# Patient Record
Sex: MALE | URBAN - METROPOLITAN AREA
[De-identification: names, ages, dates, MRNs, and addresses within clinical notes are randomized per-mention and may not be internally consistent; named-entity substitution may affect disease eponyms.]

---

## 2017-06-27 ENCOUNTER — RECORDS - HEALTHEAST (OUTPATIENT)
Dept: LAB | Facility: HOSPITAL | Age: 27
End: 2017-06-27

## 2017-06-28 LAB — HBV SURFACE AB SERPL IA-ACNC: NEGATIVE M[IU]/ML

## 2020-03-25 ENCOUNTER — RECORDS - HEALTHEAST (OUTPATIENT)
Dept: LAB | Facility: CLINIC | Age: 30
End: 2020-03-25

## 2020-03-27 LAB
GAMMA INTERFERON BACKGROUND BLD IA-ACNC: 0.05 IU/ML
M TB IFN-G BLD-IMP: NEGATIVE
MITOGEN IGNF BCKGRD COR BLD-ACNC: 0 IU/ML
MITOGEN IGNF BCKGRD COR BLD-ACNC: 0 IU/ML
QTF INTERPRETATION: NORMAL
QTF MITOGEN - NIL: 5.9 IU/ML

## 2020-07-23 ENCOUNTER — RECORDS - HEALTHEAST (OUTPATIENT)
Dept: LAB | Facility: HOSPITAL | Age: 30
End: 2020-07-23

## 2020-07-23 LAB — HEPATITIS B SURFACE ANTIBODY LHE- HISTORICAL: NEGATIVE

## 2020-07-24 LAB
GAMMA INTERFERON BACKGROUND BLD IA-ACNC: 0.05 IU/ML
M TB IFN-G BLD-IMP: NEGATIVE
MITOGEN IGNF BCKGRD COR BLD-ACNC: 0.05 IU/ML
MITOGEN IGNF BCKGRD COR BLD-ACNC: 0.11 IU/ML
QTF INTERPRETATION: NORMAL
QTF MITOGEN - NIL: 7.32 IU/ML

## 2020-12-21 ENCOUNTER — OFFICE VISIT - HEALTHEAST (OUTPATIENT)
Dept: TELEHEALTH | Facility: CLINIC | Age: 30
End: 2020-12-21

## 2020-12-21 ENCOUNTER — AMBULATORY - HEALTHEAST (OUTPATIENT)
Dept: FAMILY MEDICINE | Facility: CLINIC | Age: 30
End: 2020-12-21

## 2020-12-21 DIAGNOSIS — Z20.828 EXPOSURE TO SARS-ASSOCIATED CORONAVIRUS: ICD-10-CM

## 2020-12-21 DIAGNOSIS — Z20.822 SUSPECTED COVID-19 VIRUS INFECTION: ICD-10-CM

## 2020-12-23 ENCOUNTER — COMMUNICATION - HEALTHEAST (OUTPATIENT)
Dept: SCHEDULING | Facility: CLINIC | Age: 30
End: 2020-12-23

## 2021-04-22 ENCOUNTER — RECORDS - HEALTHEAST (OUTPATIENT)
Dept: LAB | Facility: HOSPITAL | Age: 31
End: 2021-04-22

## 2021-04-22 LAB — HBV SURFACE AB SERPL IA-ACNC: POSITIVE M[IU]/ML

## 2021-06-13 NOTE — PATIENT INSTRUCTIONS - HE
CDC Education: Antibiotics Aren't Always the Answer   https://www.cdc.gov/antibiotic-use/community/pdfs/aaw/AU_esdras_8_5x11_508.pdf  The symptoms you describe suggest a viral cause, which is much more common than a bacterial cause. Antibiotics will treat bacterial infections, but have no effect on viral infections. If possible, especially if improving, start with symptom care for the first 7-10 days, then consider seeking further treatment or taking an antibiotic. Bacterial infections generally are more severe, including symptoms such as pus, fever over 101degrees F, or rapidly worsening.  Your symptoms show that you may have coronavirus (COVID-19). This illness can cause fever, cough and trouble breathing. Many people get a mild case and get better on their own. Some people can get very sick.     Will I be tested for COVID-19?  We would like to test you for Covid-19 virus. I have placed orders for this test.      For all employees or close contacts (except [place] and [employer] - see below), go to your [employer] home page and scroll down to the section that says  You have an appointment that needs to be scheduled  and click the large green button that says  Schedule Now  and follow the steps to find the next available opening.      If you are unable to complete these steps or if you cannot find any available times, please call [phone] to schedule employee testing.         [place] employees or close contacts, please call [phone].   [place] ([employer]) employees or close contacts call [phone].     When it's time for your COVID test:  Stay at least 6 feet away from others. (If someone will drive you to your test, stay in the backseat, as far away from the  as you can.)  Cover your mouth and nose with a mask, tissue or washcloth.  Go straight to the testing site. Don't make any stops on the way there or back.     Starting now:     Do not go to work.  ? If you receive a negative COVID-19 test  "result and were NOT exposed to someone with a known positive COVID-19 test, you can return to work once you're free of fever for 24 hours without fever-reducing medication and your symptoms are improving or resolved.  ? If you receive a positive COVID-19 test result, you must be cleared by Employee Occupational Health and Safety to return to work.   ? If you were exposed to someone who has tested positive for COVID-19, you can return to work 14 days after your last contact with the positive individual, provided you do not have symptoms at all during that time. In some cases, your manager may ask you to come back sooner than 14 days.     During this time, don't leave the house except for testing or medical care.  ? Stay in your own room, even for meals. Use your own bathroom if you can.  ? Stay away from others in your home. No hugging, kissing or shaking hands. No visitors.  ? Don't go to work, school or anywhere else.    Clean \"high touch\" surfaces often (doorknobs, counters, handles, etc.). Use a household cleaning spray or wipes. You'll find a full list of  on the EPA website: www.epa.gov/pesticide-registration/list-n-disinfectants-use-against-sars-cov-2.    Cover your mouth and nose with a mask, tissue or washcloth to avoid spreading germs.    Wash your hands and face often. Use soap and water.    People in these groups are at risk for severe illness due to COVID-19:  ? People 65 years and older  ? People who live in a nursing home or long-term care facility  ? People with chronic disease (lung, heart, cancer, diabetes, kidney, liver, immunologic)  ? People who have a weakened immune system, including those who:    Are in cancer treatment    Take medicine that weakens the immune system, such as corticosteroids    Had a bone marrow or organ transplant    Have an immune deficiency    Have poorly controlled HIV or AIDS    Are obese (body mass index of 40 or higher)    Smoke regularly       Caregivers should " wear gloves while washing dishes, handling laundry and cleaning bedrooms and bathrooms.    Use caution when washing and drying laundry: Don't shake dirty laundry, and use the warmest water setting that you can.    For more tips, go to www.cdc.gov/coronavirus/2019-ncov/downloads/10Things.pdf.     Sign up for [employer]. We know it's scary to hear that you might have COVID-19. We want to track your symptoms to make sure you're okay over the next 2 weeks. Please look for an email from [employer]--this is a free, online program that we'll use to keep in touch. To sign up, follow the link in the email you will receive. Learn more at [email or web address]     How can I take care of myself?    Get lots of rest. Drink extra fluids (unless a doctor has told you not to)    Take Tylenol (acetaminophen) for fever or pain. If you have liver or kidney problems, ask your family doctor if it's okay to take Tylenol.  Adults can take either:    650 mg (two 325 mg pills) every 4 to 6 hours, or     1,000 mg (two 500 mg pills) every 8 hours as needed.    Note: Don't take more than 3,000 mg in one day. Acetaminophen is found in many medicines (both prescribed and over-the-counter medicines). Read all labels to be sure you don't take too much.  For children, check the Tylenol bottle for the right dose. The dose is based on the child's age or weight.    If you have other health problems (like cancer, heart failure, an organ transplant or severe kidney disease): Call your specialty clinic if you don't feel better in the next 2 days.    Know when to call 911. Emergency warning signs include:  Trouble breathing or shortness of breath  Pain or pressure in the chest that doesn't go away  Feeling confused like you haven't felt before, or not being able to wake up  Bluish-colored lips or face     Where can I get more information?    [facility] - About COVID-19: [email or web address]  CDC - What to Do If You're  Sick: www.cdc.gov/coronavirus/2019-ncov/about/steps-when-sick.html

## 2021-06-26 ENCOUNTER — HEALTH MAINTENANCE LETTER (OUTPATIENT)
Age: 31
End: 2021-06-26

## 2021-08-10 ENCOUNTER — OFFICE VISIT (OUTPATIENT)
Dept: FAMILY MEDICINE | Facility: CLINIC | Age: 31
End: 2021-08-10
Payer: COMMERCIAL

## 2021-08-10 VITALS — SYSTOLIC BLOOD PRESSURE: 112 MMHG | WEIGHT: 162.7 LBS | HEART RATE: 66 BPM | DIASTOLIC BLOOD PRESSURE: 60 MMHG

## 2021-08-10 DIAGNOSIS — J45.20 MILD INTERMITTENT ASTHMA WITHOUT COMPLICATION: ICD-10-CM

## 2021-08-10 DIAGNOSIS — K13.0 MUCOCELE OF LOWER LIP: Primary | ICD-10-CM

## 2021-08-10 PROCEDURE — 99203 OFFICE O/P NEW LOW 30 MIN: CPT | Performed by: NURSE PRACTITIONER

## 2021-08-10 RX ORDER — ALBUTEROL SULFATE 90 UG/1
AEROSOL, METERED RESPIRATORY (INHALATION)
COMMUNITY
Start: 2021-03-31 | End: 2021-12-17

## 2021-08-10 RX ORDER — ALBUTEROL SULFATE 90 UG/1
1-2 AEROSOL, METERED RESPIRATORY (INHALATION) EVERY 6 HOURS PRN
Qty: 6.7 G | Refills: 1 | Status: SHIPPED | OUTPATIENT
Start: 2021-08-10 | End: 2021-08-17

## 2021-08-10 RX ORDER — ALBUTEROL SULFATE 90 UG/1
1-2 AEROSOL, METERED RESPIRATORY (INHALATION)
COMMUNITY
Start: 2021-03-31 | End: 2021-08-10

## 2021-08-10 RX ORDER — LORATADINE 10 MG/1
10 TABLET ORAL
COMMUNITY

## 2021-08-10 RX ORDER — FLUTICASONE FUROATE 200 UG/1
POWDER RESPIRATORY (INHALATION)
Qty: 30 EACH | Refills: 1 | Status: SHIPPED | OUTPATIENT
Start: 2021-08-10 | End: 2021-12-17

## 2021-08-10 RX ORDER — FLUTICASONE FUROATE 200 UG/1
POWDER RESPIRATORY (INHALATION)
COMMUNITY
Start: 2021-03-31 | End: 2021-08-10

## 2021-08-10 NOTE — PROGRESS NOTES
Assessment & Plan     Mucocele of lower lip  Recurring mucocele of left lower lip.  Been recurring nature, week discussed option for ENT evaluation for further assessment/management.  Patient is interested in this, referral will be placed today.   - Otolaryngology Referral    Mild intermittent asthma without complication  Symptoms have been well controlled on current regimen.  Refill provided for Arnuity and albuterol inhaler.  Will discuss further at his upcoming physical exam.  - albuterol (PROAIR HFA/PROVENTIL HFA/VENTOLIN HFA) 108 (90 Base) MCG/ACT inhaler  Dispense: 6.7 g; Refill: 1  - ARNUITY ELLIPTA 200 MCG/ACT inhaler  Dispense: 30 each; Refill: 1      Return in about 1 week (around 8/17/2021) for Routine preventive, with me.    LUCY Resendiz CNP  [facility] KARAN Myles is a 31 year old who presents for the following health issues: Medication check    HPI   Patient is here today for evaluation of a recurring lesion on the inside of his left lower lip.  He describes a recurring cystlike lesion that first occurred years ago without any particular injury or trauma.  It was not necessarily painful but somewhat bothersome especially with chewing etc.  He used a sterilized needle a few times to drain it.  Typically clear to yellow fluid.  No pus or bleeding.  This would resolve it for some time before he would notice recurrence.  He did notice somewhat of a callus form at one point and felt that decreased sensation around the area, possibly from using a needle to drain it several times.  The lesion has since returned.  It is not painful or draining at this point.      Patient has history of mild, intermittent asthma which has been well controlled overall.  Uses Arnuity Ellipta inhaler and albuterol mostly on an as-needed basis.  Only having to use his albuterol very sparingly.  He is requesting refill of of both today.    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  He plans to schedule a physical exam next week to do so.    Review of Systems   Review of Systems - pertinent positives noted in HPI, otherwise ROS is negative.        Objective    /60 (BP Location: Right arm, Patient Position: Sitting, Cuff Size: Adult Regular)   Pulse 66   Wt 73.8 kg (162 lb 11.2 oz)   There is no height or weight on file to calculate BMI.     Physical Exam   GENERAL: healthy, alert and no distress  HENT: Small flesh-colored cystic lesion noted on inside of left lower lip.  No surrounding erythema, warmth or tenderness.  No drainage.    NECK: no adenopathy, no asymmetry or masses.  RESP: lungs clear to auscultation - no rales, rhonchi or wheeze.  Respirations unlabored.  CV: regular rate and rhythm, normal S1 S2, no S3 or S4, no murmur, click or rub, no peripheral edema and peripheral pulses strong

## 2021-08-11 ASSESSMENT — ASTHMA QUESTIONNAIRES: ACT_TOTALSCORE: 23

## 2021-08-17 ENCOUNTER — OFFICE VISIT (OUTPATIENT)
Dept: FAMILY MEDICINE | Facility: CLINIC | Age: 31
End: 2021-08-17
Payer: COMMERCIAL

## 2021-08-17 VITALS
OXYGEN SATURATION: 94 % | BODY MASS INDEX: 23.61 KG/M2 | WEIGHT: 159.4 LBS | HEIGHT: 69 IN | HEART RATE: 60 BPM | DIASTOLIC BLOOD PRESSURE: 62 MMHG | SYSTOLIC BLOOD PRESSURE: 100 MMHG

## 2021-08-17 DIAGNOSIS — J45.20 MILD INTERMITTENT ASTHMA WITHOUT COMPLICATION: ICD-10-CM

## 2021-08-17 DIAGNOSIS — Z00.00 ROUTINE GENERAL MEDICAL EXAMINATION AT A HEALTH CARE FACILITY: Primary | ICD-10-CM

## 2021-08-17 DIAGNOSIS — K13.0 MUCOCELE OF LOWER LIP: ICD-10-CM

## 2021-08-17 DIAGNOSIS — Z13.220 SCREENING FOR LIPID DISORDERS: ICD-10-CM

## 2021-08-17 PROCEDURE — 99395 PREV VISIT EST AGE 18-39: CPT | Performed by: NURSE PRACTITIONER

## 2021-08-17 ASSESSMENT — MIFFLIN-ST. JEOR: SCORE: 1668.41

## 2021-08-17 NOTE — PROGRESS NOTES
Answers for HPI/ROS submitted by the patient on 8/17/2021  Frequency of exercise:: 4-5 days/week  Getting at least 3 servings of Calcium per day:: NO  Diet:: Regular (no restrictions)  Taking medications regularly:: No  Medication side effects:: None  Bi-annual eye exam:: NO  Dental care twice a year:: Yes  Sleep apnea or symptoms of sleep apnea:: None  Additional concerns today:: Yes  Duration of exercise:: 30-45 minutes  Barriers to taking medications:: Problems remembering to take them

## 2021-08-17 NOTE — PROGRESS NOTES
SUBJECTIVE:   CC: Shar Diaz is an 31 year old male who presents for preventative health visit.    Reports doing well today, does not have any specific concerns at this time.  He is generally healthy without significant past medical history.  He does have mild, intermittent asthma well-controlled with annuity Ellipta and albuterol as needed.  No recent exacerbations.  He was recently referred to ENT for evaluation of a recurrent mucocele in his left lower lip.  He is very active, exercising most days of the week.  Enjoys running.  He consumes a healthy diet most of the time.  Rarely drinks alcohol.  No tobacco use or drug use.  He enjoys traveling with his .  No concerns for STDs.  Feels that his mood is good and denies any concern of depression or anxiety currently.  We reviewed family history which includes atrial fibrillation and congenital heart defect in his father, autosomal dominant polycystic kidney disease on his father's side.  There have been no symptoms or concerns of kidney disease in his father or immediate family. He is not aware of any significant medical history on his mother side.      Patient has been advised of split billing requirements and indicates understanding: Yes  Healthy Habits:     Getting at least 3 servings of Calcium per day:  NO    Bi-annual eye exam:  NO    Dental care twice a year:  Yes    Sleep apnea or symptoms of sleep apnea:  None    Diet:  Regular (no restrictions)    Frequency of exercise:  4-5 days/week    Duration of exercise:  30-45 minutes    Taking medications regularly:  No    Barriers to taking medications:  Problems remembering to take them    Medication side effects:  None    PHQ-2 Total Score: 0    Additional concerns today:  Yes      Today's PHQ-2 Score:   PHQ-2 ( 1999 Pfizer) 8/17/2021   Q1: Little interest or pleasure in doing things 0   Q2: Feeling down, depressed or hopeless 0   PHQ-2 Score 0   Q1: Little interest or pleasure in doing things Not at  all   Q2: Feeling down, depressed or hopeless Not at all   PHQ-2 Score 0       Abuse: Current or Past(Physical, Sexual or Emotional)- No  Do you feel safe in your environment? Yes    Have you ever done Advance Care Planning? (For example, a Health Directive, POLST, or a discussion with a medical provider or your loved ones about your wishes): No, advance care planning information given to patient to review.  Patient plans to discuss their wishes with loved ones or provider.      Social History     Tobacco Use     Smoking status: Not on file   Substance Use Topics     Alcohol use: Not on file     If you drink alcohol do you typically have >3 drinks per day or >7 drinks per week? No      Reviewed orders with patient. Reviewed health maintenance and updated orders accordingly - Yes  Lab work is in process    Reviewed and updated as needed this visit by clinical staff   Allergies  Meds              Reviewed and updated as needed this visit by Provider    Meds             History reviewed. No pertinent past medical history.   History reviewed. No pertinent surgical history.    Review of Systems  CONSTITUTIONAL: NEGATIVE for fever, chills, change in weight  INTEGUMENTARY/SKIN: NEGATIVE for worrisome rashes, moles or lesions  EYES: NEGATIVE for vision changes or irritation  ENT: NEGATIVE for ear, mouth and throat problems  RESP: NEGATIVE for significant cough or SOB  CV: NEGATIVE for chest pain, palpitations or peripheral edema  GI: NEGATIVE for nausea, abdominal pain, heartburn, or change in bowel habits   male: negative for dysuria, hematuria, decreased urinary stream, erectile dysfunction, urethral discharge  MUSCULOSKELETAL: NEGATIVE for significant arthralgias or myalgia  NEURO: NEGATIVE for weakness, dizziness or paresthesias  PSYCHIATRIC: NEGATIVE for changes in mood or affect    OBJECTIVE:   /62 (BP Location: Right arm, Patient Position: Sitting, Cuff Size: Adult Regular)   Pulse 60   Ht 1.753 m (5'  "9\")   Wt 72.3 kg (159 lb 6.4 oz)   SpO2 94%   BMI 23.54 kg/m      Physical Exam  GENERAL: healthy, alert and no distress  EYES: Eyes grossly normal to inspection, PERRL and conjunctivae and sclerae normal  HENT: ear canals and TM's normal, nose and mouth without ulcers or lesions  NECK: no adenopathy, no asymmetry, masses, or scars and thyroid normal to palpation  RESP: lungs clear to auscultation - no rales, rhonchi or wheezes  CV: regular rate and rhythm, normal S1 S2, no S3 or S4, no murmur, click or rub, no peripheral edema and peripheral pulses strong  ABDOMEN: soft, nontender, no hepatosplenomegaly, no masses and bowel sounds normal  MS: no gross musculoskeletal defects noted, no edema  SKIN: no suspicious lesions or rashes  NEURO: Normal strength and tone, mentation intact and speech normal  PSYCH: mentation appears normal, affect normal/bright    Diagnostic Test Results:  Labs reviewed in Epic  none     ASSESSMENT/PLAN:     1. Routine general medical examination at a health care facility  Annual exam completed today.  Reviewed routine healthcare maintenance.  Will obtain labs today to include: Screening for diabetes and cholesterol as well as routine HIV and hepatitis C screening. We discussed advance care planning, information provided. He is up-to-date on immunizations and all other health care maintenance at this time.  I recommend returning to clinic in 1 year for annual exam or sooner with any new concerns.  - CBC with platelets; Future  - Basic metabolic panel  (Ca, Cl, CO2, Creat, Gluc, K, Na, BUN); Future  - Lipid panel reflex to direct LDL Fasting; Future  - Hepatitis C antibody; Future  - HIV Antigen Antibody Combo; Future    2. Mild intermittent asthma without complication  Symptoms well controlled on current regimen without any recent exacerbations.  He will continue with Arnuity Ellipta and albuterol as prescribed.    3. Screening for lipid disorders  Will check fasting lipids.    4. Mucocele " "of lower lip  Was recently seen for evaluation mucocele of left lower lip.  He has referral placed to ENT.    Patient has been advised of split billing requirements and indicates understanding: Yes  COUNSELING:   Reviewed preventive health counseling, as reflected in patient instructions       Regular exercise       Healthy diet/nutrition       Consider Hep C screening for all patients one time for ages 18-79 years       HIV screeninx in teen years, 1x in adult years, and at intervals if high risk       Advance Care Planning    Estimated body mass index is 23.54 kg/m  as calculated from the following:    Height as of this encounter: 1.753 m (5' 9\").    Weight as of this encounter: 72.3 kg (159 lb 6.4 oz).         He has no history on file for tobacco use.      Counseling Resources:  ATP IV Guidelines  Pooled Cohorts Equation Calculator  FRAX Risk Assessment  ICSI Preventive Guidelines  Dietary Guidelines for Americans, 2010  USDA's MyPlate  ASA Prophylaxis  Lung CA Screening    LUCY Resendiz [facility]  "

## 2021-08-24 ENCOUNTER — LAB (OUTPATIENT)
Dept: LAB | Facility: CLINIC | Age: 31
End: 2021-08-24
Payer: COMMERCIAL

## 2021-08-24 DIAGNOSIS — Z00.00 ROUTINE GENERAL MEDICAL EXAMINATION AT A HEALTH CARE FACILITY: ICD-10-CM

## 2021-08-24 LAB
ANION GAP SERPL CALCULATED.3IONS-SCNC: 8 MMOL/L (ref 5–18)
BUN SERPL-MCNC: 23 MG/DL (ref 8–22)
CALCIUM SERPL-MCNC: 9.4 MG/DL (ref 8.5–10.5)
CHLORIDE BLD-SCNC: 108 MMOL/L (ref 98–107)
CHOLEST SERPL-MCNC: 153 MG/DL
CO2 SERPL-SCNC: 23 MMOL/L (ref 22–31)
CREAT SERPL-MCNC: 0.99 MG/DL (ref 0.7–1.3)
ERYTHROCYTE [DISTWIDTH] IN BLOOD BY AUTOMATED COUNT: 12 % (ref 10–15)
FASTING STATUS PATIENT QL REPORTED: YES
GFR SERPL CREATININE-BSD FRML MDRD: >90 ML/MIN/1.73M2
GLUCOSE BLD-MCNC: 78 MG/DL (ref 70–125)
HCT VFR BLD AUTO: 46.3 % (ref 40–53)
HDLC SERPL-MCNC: 56 MG/DL
HGB BLD-MCNC: 16.4 G/DL (ref 13.3–17.7)
HIV 1+2 AB+HIV1 P24 AG SERPL QL IA: NEGATIVE
LDLC SERPL CALC-MCNC: 83 MG/DL
MCH RBC QN AUTO: 31 PG (ref 26.5–33)
MCHC RBC AUTO-ENTMCNC: 35.4 G/DL (ref 31.5–36.5)
MCV RBC AUTO: 88 FL (ref 78–100)
PLATELET # BLD AUTO: 193 10E3/UL (ref 150–450)
POTASSIUM BLD-SCNC: 4.6 MMOL/L (ref 3.5–5)
RBC # BLD AUTO: 5.29 10E6/UL (ref 4.4–5.9)
SODIUM SERPL-SCNC: 139 MMOL/L (ref 136–145)
TRIGL SERPL-MCNC: 69 MG/DL
WBC # BLD AUTO: 4.4 10E3/UL (ref 4–11)

## 2021-08-24 PROCEDURE — 85027 COMPLETE CBC AUTOMATED: CPT

## 2021-08-24 PROCEDURE — 80061 LIPID PANEL: CPT

## 2021-08-24 PROCEDURE — 86803 HEPATITIS C AB TEST: CPT

## 2021-08-24 PROCEDURE — 80048 BASIC METABOLIC PNL TOTAL CA: CPT

## 2021-08-24 PROCEDURE — 36415 COLL VENOUS BLD VENIPUNCTURE: CPT

## 2021-08-24 PROCEDURE — 87389 HIV-1 AG W/HIV-1&-2 AB AG IA: CPT

## 2021-08-25 LAB — HCV AB SERPL QL IA: NEGATIVE

## 2021-09-07 NOTE — PROGRESS NOTES
CHIEF COMPLAINT:  Lip Concern      HISTORY OF PRESENT ILLNESS    Shar was seen at the behest of Estella Mathews CNP for a mucocele.   It has been present for several weeks.  He has drained it several times but it keep recurring.  He has had these in the past (several years ago).  Denies lip chewing.          REVIEW OF SYSTEMS    Review of Systems as per HPI and PMHx, otherwise 10 system review system are negative.     Dust mites and Feathers     There were no vitals taken for this visit.    HEAD: Normal appearance and symmetry:  No cutaneous lesions.      NECK:  supple     EARS: normal TM, EACs     NOSE:     Dorsum:   straight  Septum:  midline  Mucosa:  moist  Inferior turbinates:  wnl        ORAL CAVITY/OROPHARYNX:     Lips:  small mucocele left lower lip  Tongue: normal, midline  Mucosa:   no lesions  Tonsils:  1+     NECK:  Trachea:  midline.              Thyroid:  normal              Adenopathy:  none        NEURO:   Alert and Oriented     GAIT AND STATION:  normal     RESPIRATORY:   Symmetry and Respiratory effort     PSYCH:  Normal mood and affect     SKIN:   warm and dry         IMPRESSION:    Encounter Diagnosis   Name Primary?     Mucocele of lip Yes          RECOMMENDATIONS:    Recommend excision under local anesthesia.   R/B/A discussed.    All questions were answered.   The patient is agreeable with this plan of care.

## 2021-09-08 ENCOUNTER — OFFICE VISIT (OUTPATIENT)
Dept: OTOLARYNGOLOGY | Facility: CLINIC | Age: 31
End: 2021-09-08
Attending: NURSE PRACTITIONER
Payer: COMMERCIAL

## 2021-09-08 DIAGNOSIS — K13.0 MUCOCELE OF LIP: Primary | ICD-10-CM

## 2021-09-08 DIAGNOSIS — K13.0 MUCOCELE OF LOWER LIP: ICD-10-CM

## 2021-09-08 PROCEDURE — 99203 OFFICE O/P NEW LOW 30 MIN: CPT | Performed by: OTOLARYNGOLOGY

## 2021-09-08 NOTE — LETTER
9/8/2021         RE: Shar Diaz  [address] [address]        Dear Colleague,    Thank you for referring your patient, Shar Diaz, to the [facility]. Please see a copy of my visit note below.    CHIEF COMPLAINT:  Lip Concern      HISTORY OF PRESENT ILLNESS    Shar was seen at the behest of Estella Mathews CNP for a mucocele.   It has been present for several weeks.  He has drained it several times but it keep recurring.  He has had these in the past (several years ago).  Denies lip chewing.          REVIEW OF SYSTEMS    Review of Systems as per HPI and PMHx, otherwise 10 system review system are negative.     Dust mites and Feathers     There were no vitals taken for this visit.    HEAD: Normal appearance and symmetry:  No cutaneous lesions.      NECK:  supple     EARS: normal TM, EACs     NOSE:     Dorsum:   straight  Septum:  midline  Mucosa:  moist  Inferior turbinates:  wnl        ORAL CAVITY/OROPHARYNX:     Lips:  small mucocele left lower lip  Tongue: normal, midline  Mucosa:   no lesions  Tonsils:  1+     NECK:  Trachea:  midline.              Thyroid:  normal              Adenopathy:  none        NEURO:   Alert and Oriented     GAIT AND STATION:  normal     RESPIRATORY:   Symmetry and Respiratory effort     PSYCH:  Normal mood and affect     SKIN:   warm and dry         IMPRESSION:    Encounter Diagnosis   Name Primary?     Mucocele of lip Yes          RECOMMENDATIONS:    Recommend excision under local anesthesia.   R/B/A discussed.    All questions were answered.   The patient is agreeable with this plan of care.         Again, thank you for allowing me to participate in the care of your patient.        Sincerely,        Scar Nunez MD

## 2021-09-29 PROBLEM — J45.30 MILD PERSISTENT ASTHMA: Status: ACTIVE | Noted: 2021-09-29

## 2021-09-29 PROBLEM — J30.2 SEASONAL ALLERGIC RHINITIS: Status: ACTIVE | Noted: 2021-09-29

## 2021-10-16 ENCOUNTER — HEALTH MAINTENANCE LETTER (OUTPATIENT)
Age: 31
End: 2021-10-16

## 2021-12-17 ENCOUNTER — TELEPHONE (OUTPATIENT)
Dept: FAMILY MEDICINE | Facility: CLINIC | Age: 31
End: 2021-12-17
Payer: COMMERCIAL

## 2021-12-17 DIAGNOSIS — J45.20 MILD INTERMITTENT ASTHMA WITHOUT COMPLICATION: ICD-10-CM

## 2021-12-17 RX ORDER — ALBUTEROL SULFATE 90 UG/1
AEROSOL, METERED RESPIRATORY (INHALATION)
Qty: 18 G | Refills: 5 | Status: SHIPPED | OUTPATIENT
Start: 2021-12-17 | End: 2022-08-10

## 2021-12-17 RX ORDER — PREDNISONE 20 MG/1
20 TABLET ORAL DAILY
Qty: 5 TABLET | Refills: 1 | Status: SHIPPED | OUTPATIENT
Start: 2021-12-17 | End: 2023-02-27

## 2021-12-17 RX ORDER — FLUTICASONE FUROATE 200 UG/1
POWDER RESPIRATORY (INHALATION)
Qty: 30 EACH | Refills: 2 | Status: SHIPPED | OUTPATIENT
Start: 2021-12-17 | End: 2023-02-27

## 2021-12-17 NOTE — TELEPHONE ENCOUNTER
I am happy to help, what has worked well for him in terms of a course in the past?  I would typically do something along the lines of 20 mg daily for 5 to 7 days, has another regiment worked well for him?  Let me know and we will get it sent into his pharmacy.

## 2021-12-17 NOTE — TELEPHONE ENCOUNTER
Spoke with patient.  Has only used prednisone 1 time before it was a longtime ago. Will defer to Dr Garcia. Is okay with prednisone 20mg a day for 5 days.  Also requesting 2 inhaler rx's.  Set up for review.

## 2021-12-17 NOTE — TELEPHONE ENCOUNTER
Patient is requesting a course of Prednisone to assist with his asthma. [place]     Please advise. Pharmacy in chart is preferred.

## 2022-04-26 DIAGNOSIS — J45.20 MILD INTERMITTENT ASTHMA WITHOUT COMPLICATION: Primary | ICD-10-CM

## 2022-04-26 RX ORDER — PREDNISONE 20 MG/1
20 TABLET ORAL DAILY
Qty: 5 TABLET | Refills: 0 | Status: SHIPPED | OUTPATIENT
Start: 2022-04-26 | End: 2023-02-27

## 2022-07-15 DIAGNOSIS — F41.9 ANXIETY: Primary | ICD-10-CM

## 2022-07-15 RX ORDER — CITALOPRAM HYDROBROMIDE 20 MG/1
20 TABLET ORAL DAILY
Qty: 30 TABLET | Refills: 3 | Status: SHIPPED | OUTPATIENT
Start: 2022-07-15 | End: 2022-11-16

## 2022-08-09 ENCOUNTER — ALLIED HEALTH/NURSE VISIT (OUTPATIENT)
Dept: FAMILY MEDICINE | Facility: CLINIC | Age: 32
End: 2022-08-09
Payer: COMMERCIAL

## 2022-08-09 DIAGNOSIS — J02.9 SORE THROAT: Primary | ICD-10-CM

## 2022-08-09 DIAGNOSIS — J02.9 ACUTE PHARYNGITIS, UNSPECIFIED ETIOLOGY: Primary | ICD-10-CM

## 2022-08-09 DIAGNOSIS — J02.9 ACUTE PHARYNGITIS, UNSPECIFIED ETIOLOGY: ICD-10-CM

## 2022-08-09 LAB
FLUAV AG SPEC QL IA: NEGATIVE
FLUBV AG SPEC QL IA: NEGATIVE

## 2022-08-09 PROCEDURE — 99207 PR NO CHARGE NURSE ONLY: CPT

## 2022-08-09 PROCEDURE — U0003 INFECTIOUS AGENT DETECTION BY NUCLEIC ACID (DNA OR RNA); SEVERE ACUTE RESPIRATORY SYNDROME CORONAVIRUS 2 (SARS-COV-2) (CORONAVIRUS DISEASE [COVID-19]), AMPLIFIED PROBE TECHNIQUE, MAKING USE OF HIGH THROUGHPUT TECHNOLOGIES AS DESCRIBED BY CMS-2020-01-R: HCPCS

## 2022-08-09 PROCEDURE — 87804 INFLUENZA ASSAY W/OPTIC: CPT

## 2022-08-09 PROCEDURE — U0005 INFEC AGEN DETEC AMPLI PROBE: HCPCS

## 2022-08-10 ENCOUNTER — TELEPHONE (OUTPATIENT)
Dept: NURSING | Facility: CLINIC | Age: 32
End: 2022-08-10

## 2022-08-10 DIAGNOSIS — J45.20 MILD INTERMITTENT ASTHMA WITHOUT COMPLICATION: ICD-10-CM

## 2022-08-10 LAB — SARS-COV-2 RNA RESP QL NAA+PROBE: POSITIVE

## 2022-08-10 RX ORDER — ALBUTEROL SULFATE 90 UG/1
AEROSOL, METERED RESPIRATORY (INHALATION)
Qty: 18 G | Refills: 5 | Status: SHIPPED | OUTPATIENT
Start: 2022-08-10

## 2022-08-10 NOTE — TELEPHONE ENCOUNTER
Coronavirus (COVID-19) Notification    Caller Name (Patient, parent, daughter/son, grandparent, etc)  Luke    Reason for call  Notify of Positive Coronavirus (COVID-19) lab results, assess symptoms,  review [facility] recommendations    Lab Result    Lab test:  2019-nCoV rRt-PCR or SARS-CoV-2 PCR    Oropharyngeal AND/OR nasopharyngeal swabs is POSITIVE for 2019-nCoV RNA/SARS-COV-2 PCR (COVID-19 virus)      Gather patient reported symptoms   Assessment   Current Symptoms at time of phone call, reported by patient: (if no symptoms, document: No symptoms] Yes    Date of symptom(s) onset (if applicable) 8/8/22     If at time of call, Patients symptoms have worsened, the Patient should contact 911 or have someone drive them to Emergency Dept promptly:      If Patient calling 911, inform 911 personal that you have tested positive for the Coronavirus (COVID-19).  Place mask on and await 911 to arrive.    If Emergency Dept, If possible, please have another adult drive you to the Emergency Dept but you need to wear mask when in contact with other people.      Treatment Options:   Patient classified as COVID treatment eligible by Epic high risk algorithm: No  You may be eligible to receive a new treatment with a monoclonal antibody for preventing hospitalization in patients at high risk for complications from COVID-19.  This medication is still experimental and available on a limited basis; it is given through an IV and must be given at an infusion center.  Please note that not all people who are eligible will receive the medication since it is in limited supply.   Is the patient symptomatic and onset of symptoms within the last 7 days? No. Patient does not qualify.    Review information with Patient    Your result was positive. This means you have COVID-19 (coronavirus).    How can I protect others?    These guidelines are for isolating before returning to work, school or .    If you DO have symptoms    Stay  home and away from others     For at least 5 days after your symptoms started, AND    You are fever free for 24 hours (with no medicine that reduces fever), AND    Your other symptoms are better    Wear a mask for 10 full days anytime you are around others    If you DON'T have symptoms    Stay home and away from others for at least 5 days after your positive test    Wear a mask for 10 full days anytime you are around others    There may be different guidelines for healthcare facilities.  Please check with the specific sites before arriving.    If you have been told by a doctor that you were severely ill with COVID-19 or are immunocompromised, you should isolate for at least 10 days.    You should not go back to work until you meet the guidelines above for ending your home isolation. You don't need to be retested for COVID-19 before going back to work--studies show that you won't spread the virus if it's been at least 10 days since your symptoms started (or 20 days, if you have a weak immune system).    Employers, schools, and daycares: This is an official notice for this person's medical guidelines for returning in-person.  They must meet the above guidelines before going back to work, school or  in person.    You will receive a positive COVID-19 letter via [employer] or the mail soon with additional self-care information.    Would you like me to review some of that information with you now?  No    If you were tested for an upcoming procedure, please contact your provider for next steps.    Heather Johnson

## 2022-08-11 DIAGNOSIS — U07.1 INFECTION DUE TO 2019 NOVEL CORONAVIRUS: Primary | ICD-10-CM

## 2022-10-01 ENCOUNTER — HEALTH MAINTENANCE LETTER (OUTPATIENT)
Age: 32
End: 2022-10-01

## 2022-10-18 ENCOUNTER — LAB (OUTPATIENT)
Dept: LAB | Facility: CLINIC | Age: 32
End: 2022-10-18
Payer: COMMERCIAL

## 2022-10-18 DIAGNOSIS — J02.9 ACUTE PHARYNGITIS, UNSPECIFIED ETIOLOGY: Primary | ICD-10-CM

## 2022-10-18 DIAGNOSIS — J02.9 ACUTE PHARYNGITIS, UNSPECIFIED ETIOLOGY: ICD-10-CM

## 2022-10-18 LAB
DEPRECATED S PYO AG THROAT QL EIA: NEGATIVE
GROUP A STREP BY PCR: NOT DETECTED

## 2022-10-18 PROCEDURE — 87651 STREP A DNA AMP PROBE: CPT

## 2022-11-16 DIAGNOSIS — F41.9 ANXIETY: ICD-10-CM

## 2022-11-16 RX ORDER — CITALOPRAM HYDROBROMIDE 20 MG/1
20 TABLET ORAL DAILY
Qty: 30 TABLET | Refills: 3 | Status: SHIPPED | OUTPATIENT
Start: 2022-11-16 | End: 2023-02-27

## 2023-02-06 ENCOUNTER — TELEPHONE (OUTPATIENT)
Dept: SCHEDULING | Facility: CLINIC | Age: 33
End: 2023-02-06
Payer: COMMERCIAL

## 2023-02-06 NOTE — TELEPHONE ENCOUNTER
Dear Shar Diaz    The virus that causes mpox has spread across several countries since May 2022. It spreads mostly through close, intimate contact.     The supply of the vaccine that protects against mpox (JYNNEOS vaccine) is limited, and you'll need to meet certain criteria to get vaccinated. We expect that the vaccine supply will continue to improve, but the supply depends on how many vaccines the [facility] receives. Vaccine guidance will continue to change. Please see [email or web address] for the most current information.    Currently, you can get a JYNNEOS vaccine at [facility] if:     Post exposure prophylaxis (PEP): Within 14 days of exposure:    1. You've had close contact with someone who has a confirmed or probable mpox disease, and this contact was within the last 14 days. Close contact means:    a. Unprotected contact with the person's skin or mucous membranes (such as inside the mouth), lesions (rashes, sores or wounds) or bodily fluids.   b. Touching any material (such as bedding or towels) that has been in contact with infected skin lesions.   c. Face-to-face contact with a person within 6 feet, for more than 3 hours, without wearing a surgical face mask.  d. Face-to-face contact with a person within 6 feet or a patient during any procedures that may create aerosols without wearing an N95 mask.  2.   You have been contacted by the [facility] of a  potential exposure to the virus that causes mpox.    OR, do need pre-exposure prophylaxis (PrEP): High-risk for potential exposure:    3.   Do you engage in sex work, or exchange sex for food, money, substances, shelter, etc.    4.   Are you mills, queer or bisexual man, and/or a man who has sex with men (MSM) or transgender (including trans male, trans woman, nonbinary or gender-nonconforming person)?    5.   Do you have sexual partners who meet the above criteria and/or do you anticipate  experiencing the above criteria?    6.   Have you been treated for a sexually transmitted infection (STD/STI) in the last 6 months, are you experiencing homelessness, and/or are you in contact with the justice system?    OR, if you think you are at a higher risk to get the virus that causes mpox, please inform us of this risk.      If you meet the criteria above, please reply to this message.   Yes    We'll decide if the JYNNEOS vaccine is right for you, and we'll set up a visit for you to get vaccinated if needed. Our current supply of the vaccine is very limited.        Thank you,

## 2023-02-07 ENCOUNTER — ALLIED HEALTH/NURSE VISIT (OUTPATIENT)
Dept: NURSING | Facility: CLINIC | Age: 33
End: 2023-02-07
Payer: COMMERCIAL

## 2023-02-07 DIAGNOSIS — Z23 ENCOUNTER FOR ADMINISTRATION OF VACCINE: Primary | ICD-10-CM

## 2023-02-07 DIAGNOSIS — Z20.89 CONTACT WITH OR EXPOSURE TO SMALLPOX: ICD-10-CM

## 2023-02-07 PROCEDURE — 99207 PR NO CHARGE NURSE ONLY: CPT

## 2023-02-07 PROCEDURE — 90471 IMMUNIZATION ADMIN: CPT

## 2023-02-07 PROCEDURE — 90611 SMALLPOX&MONKEYPOX VAC 0.5ML: CPT

## 2023-02-22 ASSESSMENT — ASTHMA QUESTIONNAIRES
QUESTION_4 LAST FOUR WEEKS HOW OFTEN HAVE YOU USED YOUR RESCUE INHALER OR NEBULIZER MEDICATION (SUCH AS ALBUTEROL): NOT AT ALL
QUESTION_5 LAST FOUR WEEKS HOW WOULD YOU RATE YOUR ASTHMA CONTROL: COMPLETELY CONTROLLED
QUESTION_1 LAST FOUR WEEKS HOW MUCH OF THE TIME DID YOUR ASTHMA KEEP YOU FROM GETTING AS MUCH DONE AT WORK, SCHOOL OR AT HOME: NONE OF THE TIME
ACT_TOTALSCORE: 25
QUESTION_3 LAST FOUR WEEKS HOW OFTEN DID YOUR ASTHMA SYMPTOMS (WHEEZING, COUGHING, SHORTNESS OF BREATH, CHEST TIGHTNESS OR PAIN) WAKE YOU UP AT NIGHT OR EARLIER THAN USUAL IN THE MORNING: NOT AT ALL
ACT_TOTALSCORE: 25
QUESTION_2 LAST FOUR WEEKS HOW OFTEN HAVE YOU HAD SHORTNESS OF BREATH: NOT AT ALL

## 2023-02-22 ASSESSMENT — ENCOUNTER SYMPTOMS
CONSTIPATION: 0
HEADACHES: 0
COUGH: 0
CHILLS: 0
PARESTHESIAS: 0
FREQUENCY: 0
MYALGIAS: 0
WEAKNESS: 0
DIZZINESS: 0
FEVER: 0
DIARRHEA: 0
HEARTBURN: 0
NAUSEA: 0
ABDOMINAL PAIN: 0
PALPITATIONS: 0
JOINT SWELLING: 0
ARTHRALGIAS: 0
EYE PAIN: 0
SORE THROAT: 0
HEMATOCHEZIA: 0
NERVOUS/ANXIOUS: 0
DYSURIA: 0
HEMATURIA: 0

## 2023-02-27 ENCOUNTER — OFFICE VISIT (OUTPATIENT)
Dept: FAMILY MEDICINE | Facility: CLINIC | Age: 33
End: 2023-02-27
Payer: COMMERCIAL

## 2023-02-27 VITALS
DIASTOLIC BLOOD PRESSURE: 62 MMHG | HEIGHT: 69 IN | OXYGEN SATURATION: 98 % | HEART RATE: 83 BPM | SYSTOLIC BLOOD PRESSURE: 94 MMHG | WEIGHT: 161.31 LBS | BODY MASS INDEX: 23.89 KG/M2

## 2023-02-27 DIAGNOSIS — L20.9 ATOPIC DERMATITIS, UNSPECIFIED TYPE: Primary | ICD-10-CM

## 2023-02-27 DIAGNOSIS — Z11.3 ROUTINE SCREENING FOR STI (SEXUALLY TRANSMITTED INFECTION): ICD-10-CM

## 2023-02-27 DIAGNOSIS — T75.3XXA MOTION SICKNESS, INITIAL ENCOUNTER: ICD-10-CM

## 2023-02-27 DIAGNOSIS — J45.20 MILD INTERMITTENT ASTHMA WITHOUT COMPLICATION: ICD-10-CM

## 2023-02-27 DIAGNOSIS — Z29.81 ENCOUNTER FOR COUNSELING BEFORE STARTING AND ABOUT PRE-EXPOSURE PROPHYLAXIS FOR HIV: ICD-10-CM

## 2023-02-27 DIAGNOSIS — Z00.00 ROUTINE HISTORY AND PHYSICAL EXAMINATION OF ADULT: ICD-10-CM

## 2023-02-27 DIAGNOSIS — Z71.89 ENCOUNTER FOR COUNSELING BEFORE STARTING AND ABOUT PRE-EXPOSURE PROPHYLAXIS FOR HIV: ICD-10-CM

## 2023-02-27 PROCEDURE — 36415 COLL VENOUS BLD VENIPUNCTURE: CPT | Performed by: INTERNAL MEDICINE

## 2023-02-27 PROCEDURE — 90677 PCV20 VACCINE IM: CPT | Performed by: INTERNAL MEDICINE

## 2023-02-27 PROCEDURE — 99395 PREV VISIT EST AGE 18-39: CPT | Mod: 25 | Performed by: INTERNAL MEDICINE

## 2023-02-27 PROCEDURE — 87591 N.GONORRHOEAE DNA AMP PROB: CPT | Performed by: INTERNAL MEDICINE

## 2023-02-27 PROCEDURE — 99213 OFFICE O/P EST LOW 20 MIN: CPT | Mod: 25 | Performed by: INTERNAL MEDICINE

## 2023-02-27 PROCEDURE — 87491 CHLMYD TRACH DNA AMP PROBE: CPT | Performed by: INTERNAL MEDICINE

## 2023-02-27 PROCEDURE — 86696 HERPES SIMPLEX TYPE 2 TEST: CPT | Performed by: INTERNAL MEDICINE

## 2023-02-27 PROCEDURE — 86695 HERPES SIMPLEX TYPE 1 TEST: CPT | Performed by: INTERNAL MEDICINE

## 2023-02-27 PROCEDURE — 87389 HIV-1 AG W/HIV-1&-2 AB AG IA: CPT | Performed by: INTERNAL MEDICINE

## 2023-02-27 PROCEDURE — 80076 HEPATIC FUNCTION PANEL: CPT | Performed by: INTERNAL MEDICINE

## 2023-02-27 PROCEDURE — 86780 TREPONEMA PALLIDUM: CPT | Performed by: INTERNAL MEDICINE

## 2023-02-27 PROCEDURE — 90471 IMMUNIZATION ADMIN: CPT | Performed by: INTERNAL MEDICINE

## 2023-02-27 RX ORDER — PREDNISONE 20 MG/1
40 TABLET ORAL DAILY
Qty: 5 TABLET | Refills: 1 | Status: SHIPPED | OUTPATIENT
Start: 2023-02-27 | End: 2023-03-28

## 2023-02-27 RX ORDER — TRIAMCINOLONE ACETONIDE 1 MG/G
OINTMENT TOPICAL 2 TIMES DAILY
Qty: 30 G | Refills: 1 | Status: SHIPPED | OUTPATIENT
Start: 2023-02-27

## 2023-02-27 RX ORDER — SCOLOPAMINE TRANSDERMAL SYSTEM 1 MG/1
1 PATCH, EXTENDED RELEASE TRANSDERMAL
Qty: 4 PATCH | Refills: 1 | Status: SHIPPED | OUTPATIENT
Start: 2023-02-27 | End: 2024-03-25

## 2023-02-27 RX ORDER — EMTRICITABINE AND TENOFOVIR DISOPROXIL FUMARATE 200; 300 MG/1; MG/1
TABLET, FILM COATED ORAL
Qty: 12 TABLET | Refills: 1 | Status: SHIPPED | OUTPATIENT
Start: 2023-02-27 | End: 2023-05-25

## 2023-02-27 ASSESSMENT — ENCOUNTER SYMPTOMS
JOINT SWELLING: 0
DIZZINESS: 0
HEMATOCHEZIA: 0
SORE THROAT: 0
PALPITATIONS: 0
DYSURIA: 0
WEAKNESS: 0
HEARTBURN: 0
PARESTHESIAS: 0
FREQUENCY: 0
CHILLS: 0
HEADACHES: 0
MYALGIAS: 0
NERVOUS/ANXIOUS: 0
ARTHRALGIAS: 0
CONSTIPATION: 0
HEMATURIA: 0
EYE PAIN: 0
ABDOMINAL PAIN: 0
NAUSEA: 0
COUGH: 0
DIARRHEA: 0
FEVER: 0

## 2023-02-27 NOTE — PROGRESS NOTES
SUBJECTIVE:   CC: Shar is an 32 year old who presents for preventative health visit.   Patient has been advised of split billing requirements and indicates understanding: Yes  Healthy Habits:     Getting at least 3 servings of Calcium per day:  Yes    Bi-annual eye exam:  NO    Dental care twice a year:  Yes    Sleep apnea or symptoms of sleep apnea:  None    Diet:  Breakfast skipped    Frequency of exercise:  4-5 days/week    Duration of exercise:  45-60 minutes    Taking medications regularly:  Yes    PHQ-2 Total Score: 1    Additional concerns today:  Yes    Lipoma- noted in the groin area- has noted in area- no pain- No change with exercise- does not feel like hernia- has been working on diet and exercise and had some weight loss with this    STI testing- would like testing today. Open to discussing prep therapy    Rash noted on foot area. Using hydrocortisone- itching on the feet,       Asthma- using albuterol prn and prednisone prn  Asthma   ACT Total Scores 8/10/2021 2/22/2023   ACT TOTAL SCORE (Goal Greater than or Equal to 20) 23 25   In the past 12 months, how many times did you visit the emergency room for your asthma without being admitted to the hospital? 0 0   In the past 12 months, how many times were you hospitalized overnight because of your asthma? 0 0           Today's PHQ-2 Score:   PHQ-2 ( 1999 Pfizer) 2/22/2023   Q1: Little interest or pleasure in doing things 1   Q2: Feeling down, depressed or hopeless 0   PHQ-2 Score 1   PHQ-2 Total Score (12-17 Years)- Positive if 3 or more points; Administer PHQ-A if positive -   Q1: Little interest or pleasure in doing things Several days   Q2: Feeling down, depressed or hopeless Several days   PHQ-2 Score 2       Going on cruise- wondering about scopolamine patches    Social History     Tobacco Use     Smoking status: Never     Smokeless tobacco: Never   Substance Use Topics     Alcohol use: Yes       Alcohol Use 2/22/2023   Prescreen: >3 drinks/day or >7  "drinks/week? No   Prescreen: >3 drinks/day or >7 drinks/week? -       Last PSA: No results found for: PSA    Reviewed orders with patient. Reviewed health maintenance and updated orders accordingly - Yes  Lab work is in process    Reviewed and updated as needed this visit by clinical staff   Tobacco  Allergies  Meds              Reviewed and updated as needed this visit by Provider                     Review of Systems   Constitutional: Negative for chills and fever.   HENT: Negative for congestion, ear pain and sore throat.    Eyes: Negative for pain and visual disturbance.   Respiratory: Negative for cough.    Cardiovascular: Negative for chest pain, palpitations and peripheral edema.   Gastrointestinal: Negative for abdominal pain, constipation, diarrhea, heartburn, hematochezia and nausea.   Genitourinary: Negative for dysuria, frequency, genital sores, hematuria, impotence, penile discharge and urgency.   Musculoskeletal: Negative for arthralgias, joint swelling and myalgias.   Skin: Positive for rash.   Neurological: Negative for dizziness, weakness, headaches and paresthesias.   Psychiatric/Behavioral: Negative for mood changes. The patient is not nervous/anxious.        OBJECTIVE:   BP 94/62 (BP Location: Left arm, Patient Position: Sitting, Cuff Size: Adult Regular)   Pulse 83   Ht 1.753 m (5' 9\")   Wt 73.2 kg (161 lb 5 oz)   SpO2 98%   BMI 23.82 kg/m      Physical Exam  GENERAL: healthy, alert and no distress  EYES: Eyes grossly normal to inspection, PERRL and conjunctivae and sclerae normal  HENT: ear canals and TM's normal, nose and mouth without ulcers or lesions  NECK: no adenopathy, no asymmetry, masses, or scars and thyroid normal to palpation  RESP: lungs clear to auscultation - no rales, rhonchi or wheezes  CV: regular rate and rhythm, normal S1 S2, no S3 or S4, no murmur, click or rub, no peripheral edema and peripheral pulses strong  ABDOMEN: soft, nontender, no hepatosplenomegaly, no " masses and bowel sounds normal  ABDOMEN: palpable lymph node in left groin area- no other mass noted  MS: no gross musculoskeletal defects noted, no edema  SKIN: no suspicious lesions or rashes- noted atopic area over the left foot area  NEURO: Normal strength and tone, mentation intact and speech normal  PSYCH: mentation appears normal, affect normal/bright        ASSESSMENT/PLAN:       ICD-10-CM    1. Atopic dermatitis, unspecified type  L20.9 triamcinolone (KENALOG) 0.1 % external ointment   Use triamcinolone over area if needed, pair with clotrimazole    2. Mild intermittent asthma without complication  J45.20 predniSONE (DELTASONE) 20 MG tablet   - given prednisone for next flare if needed   3. Motion sickness, initial encounter  T75.3XXA scopolamine (TRANSDERM) 1 MG/3DAYS 72 hr patch   Given scopolamine patch for use with travel   4. Routine screening for STI (sexually transmitted infection)  Z11.3 HIV Antigen Antibody Combo     Treponema Abs w Reflex to RPR and Titer   Routine screening ordered  NEISSERIA GONORRHOEA PCR     CHLAMYDIA TRACHOMATIS PCR     Herpes Simplex Virus 1 and 2 IgG     HIV Antigen Antibody Combo     Treponema Abs w Reflex to RPR and Titer     Herpes Simplex Virus 1 and 2 IgG     NEISSERIA GONORRHOEA PCR     CHLAMYDIA TRACHOMATIS PCR      5. Encounter for counseling before starting and about pre-exposure prophylaxis for HIV  Z71.7 emtricitabine-tenofovir (TRUVADA) 200-300 MG per tablet   Discussed use of 2-1-1 prep therapy, patient interested in starting this- will give Prescription for this.   Hepatic panel (Albumin, ALT, AST, Bili, Alk Phos, TP)     Hepatic panel (Albumin, ALT, AST, Bili, Alk Phos, TP)      6. Routine history and physical examination of adult  Z00.00 Discussed routine health guidance lipid screening up to date in the last several years            COUNSELING:   Reviewed preventive health counseling, as reflected in patient instructions        He reports that he has never  smoked. He has never used smokeless tobacco.        Yasir Mehta MD  [facility]

## 2023-02-28 LAB
ALBUMIN SERPL BCG-MCNC: 4.4 G/DL (ref 3.5–5.2)
ALP SERPL-CCNC: 93 U/L (ref 40–129)
ALT SERPL W P-5'-P-CCNC: 38 U/L (ref 10–50)
AST SERPL W P-5'-P-CCNC: 66 U/L (ref 10–50)
BILIRUB DIRECT SERPL-MCNC: <0.2 MG/DL (ref 0–0.3)
BILIRUB SERPL-MCNC: 0.5 MG/DL
HSV1 IGG SERPL QL IA: 2.07 INDEX
HSV1 IGG SERPL QL IA: ABNORMAL
HSV2 IGG SERPL QL IA: 6.79 INDEX
HSV2 IGG SERPL QL IA: ABNORMAL
PROT SERPL-MCNC: 7.4 G/DL (ref 6.4–8.3)
T PALLIDUM AB SER QL: NONREACTIVE

## 2023-02-28 NOTE — PATIENT INSTRUCTIONS
Labs today as we discussed.    Can use the truvada as the 2-1-1 dosing, but you can start daily if you would like as well- just let me know and I can send in daily therapy.    Try triamcinolone over the foot area with clotrimazole or similar product.    Prednisone if needed for asthma     Pneumonia vaccine today.    Yasir Mehta MD    What should I talk about with my doctor?    Talk to your doctor about your diet, exercise, and risk factors for diseases and cancer. You can talk about ways to keep yourself healthy, and make sure you are up to date on vaccinations and screening tests. Keep your doctor updated on your family and personal health history.    What can I do to have a healthy diet?    Enjoy your food, but eat less.  Avoid large portions.  Fill about half of your plate with fruit and vegetables.  Drink skim or low-fat (1 percent) milk.  Compare the amounts of sodium in foods like soup, bread, and frozen meals. Choose foods with lower amounts.  Drink water instead of sugary drinks.    How much exercise should I get?    Adults need at least 150 minutes (two hours and 30 minutes) of exercise each week. It's fine to break it up into intervals as short as 10 minutes. Any type of activity counts, as long as it gets your heart rate up.    How much alcohol can I drink?    If you choose to drink alcohol, drink in moderation. This means no more than two drinks per day or 14 drinks per week. A drink is defined as 12 oz of beer, 5 oz of wine, or 1.5 oz (one shot) of liquor.    What tests do I need?    You can create a customized list of recommended tests based on your age and other risk factors at [employer].gov (http://healthfinder.gov/prevention/myHealthfinder.aspx).

## 2023-03-01 ENCOUNTER — MYC MEDICAL ADVICE (OUTPATIENT)
Dept: FAMILY MEDICINE | Facility: CLINIC | Age: 33
End: 2023-03-01
Payer: COMMERCIAL

## 2023-03-01 DIAGNOSIS — B00.9 HSV (HERPES SIMPLEX VIRUS) INFECTION: Primary | ICD-10-CM

## 2023-03-01 LAB
C TRACH DNA SPEC QL NAA+PROBE: NEGATIVE
HIV 1+2 AB+HIV1 P24 AG SERPL QL IA: NONREACTIVE
N GONORRHOEA DNA SPEC QL NAA+PROBE: NEGATIVE

## 2023-03-01 RX ORDER — VALACYCLOVIR HYDROCHLORIDE 1 G/1
1000 TABLET, FILM COATED ORAL 2 TIMES DAILY
Qty: 20 TABLET | Refills: 1 | Status: SHIPPED | OUTPATIENT
Start: 2023-03-01 | End: 2024-03-25

## 2023-03-01 NOTE — TELEPHONE ENCOUNTER
See MyChart from Patient needing PCP review.  Please respond directly to patient, if at all able.    Can this be an e-visit or would you prefer different?    LILLIE Vizcaino  [facility]

## 2023-03-07 ENCOUNTER — ALLIED HEALTH/NURSE VISIT (OUTPATIENT)
Dept: NURSING | Facility: CLINIC | Age: 33
End: 2023-03-07
Payer: COMMERCIAL

## 2023-03-07 DIAGNOSIS — Z23 ENCOUNTER FOR IMMUNIZATION: Primary | ICD-10-CM

## 2023-03-07 PROCEDURE — 90611 SMALLPOX&MONKEYPOX VAC 0.5ML: CPT

## 2023-03-07 PROCEDURE — 90471 IMMUNIZATION ADMIN: CPT

## 2023-03-07 PROCEDURE — 99207 PR NO CHARGE NURSE ONLY: CPT

## 2023-03-28 ENCOUNTER — MYC MEDICAL ADVICE (OUTPATIENT)
Dept: FAMILY MEDICINE | Facility: CLINIC | Age: 33
End: 2023-03-28

## 2023-03-28 DIAGNOSIS — J45.20 MILD INTERMITTENT ASTHMA WITHOUT COMPLICATION: ICD-10-CM

## 2023-03-28 RX ORDER — PREDNISONE 20 MG/1
40 TABLET ORAL DAILY
Qty: 10 TABLET | Refills: 1 | Status: SHIPPED | OUTPATIENT
Start: 2023-03-28 | End: 2024-03-25

## 2023-03-28 NOTE — TELEPHONE ENCOUNTER
Routing refill request to provider for review/approval because:  Drug not on the [facility] refill protocol     Last Written Prescription Date:  2/27/2023  Last Fill Quantity: 5,  # refills: 1   Last office visit provider:  2/27/2023    Requested Prescriptions   Pending Prescriptions Disp Refills     predniSONE (DELTASONE) 20 MG tablet 5 tablet 1     Sig: Take 2 tablets (40 mg) by mouth daily       There is no refill protocol information for this order          Danette Springer RN 03/28/23 1:27 PM

## 2023-05-01 ENCOUNTER — ALLIED HEALTH/NURSE VISIT (OUTPATIENT)
Dept: FAMILY MEDICINE | Facility: CLINIC | Age: 33
End: 2023-05-01
Payer: COMMERCIAL

## 2023-05-01 DIAGNOSIS — J02.9 SORE THROAT: Primary | ICD-10-CM

## 2023-05-01 DIAGNOSIS — J02.0 STREPTOCOCCAL PHARYNGITIS: Primary | ICD-10-CM

## 2023-05-01 DIAGNOSIS — J02.0 STREPTOCOCCAL SORE THROAT: Primary | ICD-10-CM

## 2023-05-01 LAB — DEPRECATED S PYO AG THROAT QL EIA: POSITIVE

## 2023-05-01 PROCEDURE — 87880 STREP A ASSAY W/OPTIC: CPT | Performed by: INTERNAL MEDICINE

## 2023-05-01 PROCEDURE — 99207 PR NO CHARGE NURSE ONLY: CPT

## 2023-05-01 RX ORDER — AMOXICILLIN 875 MG
875 TABLET ORAL 2 TIMES DAILY
Qty: 20 TABLET | Refills: 0 | Status: SHIPPED | OUTPATIENT
Start: 2023-05-01 | End: 2023-05-11

## 2023-05-05 ENCOUNTER — TELEPHONE (OUTPATIENT)
Dept: FAMILY MEDICINE | Facility: CLINIC | Age: 33
End: 2023-05-05
Payer: COMMERCIAL

## 2023-05-06 NOTE — TELEPHONE ENCOUNTER
Reason for Call:  Appointment Request    Patient requesting this type of appt: Chronic Diease Management/Medication/Follow-Up    Requested provider: Yasir Mehta    Reason patient unable to be scheduled: Not within requested timeframe    When does patient want to be seen/preferred time: Patient is looking for an appointment between 05/15-05/16 in the afternoon.     Comments: This came in as a [facility] appointment request. And we are unable to work this patient into Dr Mehta' schedule. Can you please follow up with the patient and see if you are able to work him in. Thank you.     Could we send this information to you in [employer] Ink or would you prefer to receive a phone call?:   No preference   Okay to leave a detailed message?: Yes at Home number on file [phone] (home)    Call taken on 5/5/2023 at 11:10 PM by Anel Brooks

## 2023-05-25 ENCOUNTER — TELEPHONE (OUTPATIENT)
Dept: FAMILY MEDICINE | Facility: CLINIC | Age: 33
End: 2023-05-25
Payer: COMMERCIAL

## 2023-05-25 ENCOUNTER — MYC MEDICAL ADVICE (OUTPATIENT)
Dept: FAMILY MEDICINE | Facility: CLINIC | Age: 33
End: 2023-05-25
Payer: COMMERCIAL

## 2023-05-25 DIAGNOSIS — Z71.89 ENCOUNTER FOR COUNSELING BEFORE STARTING AND ABOUT PRE-EXPOSURE PROPHYLAXIS FOR HIV: ICD-10-CM

## 2023-05-25 DIAGNOSIS — Z11.3 SCREEN FOR STD (SEXUALLY TRANSMITTED DISEASE): Primary | ICD-10-CM

## 2023-05-25 DIAGNOSIS — Z29.81 ENCOUNTER FOR COUNSELING BEFORE STARTING AND ABOUT PRE-EXPOSURE PROPHYLAXIS FOR HIV: ICD-10-CM

## 2023-05-25 RX ORDER — EMTRICITABINE AND TENOFOVIR DISOPROXIL FUMARATE 200; 300 MG/1; MG/1
TABLET, FILM COATED ORAL
Qty: 30 TABLET | Refills: 1 | Status: SHIPPED | OUTPATIENT
Start: 2023-05-25 | End: 2023-05-26

## 2023-05-25 NOTE — TELEPHONE ENCOUNTER
Patient is looking for a refill on Emtricitabine-Tenofovi 200-300 tablets. Patient is out of medication.

## 2023-05-26 RX ORDER — EMTRICITABINE AND TENOFOVIR DISOPROXIL FUMARATE 200; 300 MG/1; MG/1
1 TABLET, FILM COATED ORAL DAILY
Qty: 90 TABLET | Refills: 3 | Status: SHIPPED | OUTPATIENT
Start: 2023-05-26 | End: 2023-11-30

## 2023-05-26 NOTE — TELEPHONE ENCOUNTER
See MyChart from Patient needing PCP review.  Please respond directly to patient, if at all able.    LILLIE Vizcaino  [facility]

## 2023-05-26 NOTE — TELEPHONE ENCOUNTER
See MyChart from Patient needing PCP review.  Please respond directly to patient, if at all able.    LILLIE Vizcaino  [facility]

## 2023-06-01 ENCOUNTER — LAB (OUTPATIENT)
Dept: LAB | Facility: CLINIC | Age: 33
End: 2023-06-01
Payer: COMMERCIAL

## 2023-06-01 DIAGNOSIS — Z29.81 ENCOUNTER FOR COUNSELING BEFORE STARTING AND ABOUT PRE-EXPOSURE PROPHYLAXIS FOR HIV: ICD-10-CM

## 2023-06-01 DIAGNOSIS — Z11.3 SCREEN FOR STD (SEXUALLY TRANSMITTED DISEASE): ICD-10-CM

## 2023-06-01 DIAGNOSIS — Z71.89 ENCOUNTER FOR COUNSELING BEFORE STARTING AND ABOUT PRE-EXPOSURE PROPHYLAXIS FOR HIV: ICD-10-CM

## 2023-06-01 LAB
ALBUMIN SERPL BCG-MCNC: 4.1 G/DL (ref 3.5–5.2)
ALP SERPL-CCNC: 76 U/L (ref 40–129)
ALT SERPL W P-5'-P-CCNC: 21 U/L (ref 10–50)
ANION GAP SERPL CALCULATED.3IONS-SCNC: 11 MMOL/L (ref 7–15)
AST SERPL W P-5'-P-CCNC: 24 U/L (ref 10–50)
BILIRUB SERPL-MCNC: 0.6 MG/DL
BUN SERPL-MCNC: 14.8 MG/DL (ref 6–20)
CALCIUM SERPL-MCNC: 9.3 MG/DL (ref 8.6–10)
CHLORIDE SERPL-SCNC: 104 MMOL/L (ref 98–107)
CREAT SERPL-MCNC: 1.16 MG/DL (ref 0.67–1.17)
DEPRECATED HCO3 PLAS-SCNC: 25 MMOL/L (ref 22–29)
GFR SERPL CREATININE-BSD FRML MDRD: 85 ML/MIN/1.73M2
GLUCOSE SERPL-MCNC: 87 MG/DL (ref 70–99)
POTASSIUM SERPL-SCNC: 4.2 MMOL/L (ref 3.4–5.3)
PROT SERPL-MCNC: 7.3 G/DL (ref 6.4–8.3)
SODIUM SERPL-SCNC: 140 MMOL/L (ref 136–145)

## 2023-06-01 PROCEDURE — 87491 CHLMYD TRACH DNA AMP PROBE: CPT

## 2023-06-01 PROCEDURE — 80053 COMPREHEN METABOLIC PANEL: CPT

## 2023-06-01 PROCEDURE — 86780 TREPONEMA PALLIDUM: CPT

## 2023-06-01 PROCEDURE — 87591 N.GONORRHOEAE DNA AMP PROB: CPT

## 2023-06-01 PROCEDURE — 87389 HIV-1 AG W/HIV-1&-2 AB AG IA: CPT

## 2023-06-01 PROCEDURE — 36415 COLL VENOUS BLD VENIPUNCTURE: CPT

## 2023-06-02 LAB
C TRACH DNA SPEC QL NAA+PROBE: NEGATIVE
HIV 1+2 AB+HIV1 P24 AG SERPL QL IA: NONREACTIVE
N GONORRHOEA DNA SPEC QL NAA+PROBE: NEGATIVE
T PALLIDUM AB SER QL: NONREACTIVE

## 2023-06-19 ENCOUNTER — OFFICE VISIT (OUTPATIENT)
Dept: FAMILY MEDICINE | Facility: CLINIC | Age: 33
End: 2023-06-19
Payer: COMMERCIAL

## 2023-06-19 VITALS
WEIGHT: 160.2 LBS | SYSTOLIC BLOOD PRESSURE: 113 MMHG | OXYGEN SATURATION: 98 % | HEIGHT: 69 IN | TEMPERATURE: 98.4 F | DIASTOLIC BLOOD PRESSURE: 70 MMHG | BODY MASS INDEX: 23.73 KG/M2 | HEART RATE: 50 BPM

## 2023-06-19 DIAGNOSIS — L70.0 ACNE VULGARIS: ICD-10-CM

## 2023-06-19 DIAGNOSIS — R40.0 DAYTIME SLEEPINESS: ICD-10-CM

## 2023-06-19 DIAGNOSIS — Z11.3 SCREEN FOR STD (SEXUALLY TRANSMITTED DISEASE): ICD-10-CM

## 2023-06-19 DIAGNOSIS — R53.83 OTHER FATIGUE: ICD-10-CM

## 2023-06-19 DIAGNOSIS — J45.30 MILD PERSISTENT ASTHMA, UNSPECIFIED WHETHER COMPLICATED: Primary | ICD-10-CM

## 2023-06-19 PROCEDURE — 87491 CHLMYD TRACH DNA AMP PROBE: CPT | Performed by: INTERNAL MEDICINE

## 2023-06-19 PROCEDURE — 87591 N.GONORRHOEAE DNA AMP PROB: CPT | Performed by: INTERNAL MEDICINE

## 2023-06-19 PROCEDURE — 90471 IMMUNIZATION ADMIN: CPT | Performed by: INTERNAL MEDICINE

## 2023-06-19 PROCEDURE — 99214 OFFICE O/P EST MOD 30 MIN: CPT | Mod: 25 | Performed by: INTERNAL MEDICINE

## 2023-06-19 PROCEDURE — 90715 TDAP VACCINE 7 YRS/> IM: CPT | Performed by: INTERNAL MEDICINE

## 2023-06-19 RX ORDER — BUDESONIDE AND FORMOTEROL FUMARATE DIHYDRATE 80; 4.5 UG/1; UG/1
1-2 AEROSOL RESPIRATORY (INHALATION) EVERY 6 HOURS PRN
Qty: 10.2 G | Refills: 3 | Status: SHIPPED | OUTPATIENT
Start: 2023-06-19 | End: 2024-03-25

## 2023-06-19 ASSESSMENT — ENCOUNTER SYMPTOMS: FATIGUE: 1

## 2023-06-19 NOTE — PATIENT INSTRUCTIONS
We can check labs for fatigue type symptoms.     Can come back for morning labs as we discussed.     They should call to set up with sleep- let me know if issues.     Can try the symbicort to help with asthma- let me know if issues with cost on this.     Yasir Mehta MD

## 2023-06-19 NOTE — PROGRESS NOTES
Assessment & Plan     Acne vulgaris  Doing well with benzyl peroxide needs refill today  - benzoyl peroxide 5 % external liquid; Apply to affected areas once daily as needed.    Mild persistent asthma, unspecified whether complicated  Overall seems well controlled but will trial use of Symbicort for exacerbations.  If this is too expensive we can consider other modalities such as an inhaled corticosteroid.  He will check into this and let us know if we need to change.  - budesonide-formoterol (SYMBICORT) 80-4.5 MCG/ACT Inhaler; Inhale 1-2 puffs into the lungs every 6 hours as needed (wheezing or shortness of breath, use during asthma flare)    Other fatigue  Will evaluate with labs for contributing causes.  We will consider sleep evaluation referral placed given sleepiness.  Checking testosterone and other labs for contributing factors possibly slightly worse since COVID so question post-COVID fatigue as well  - CBC with platelets and differential; Future  - TSH with free T4 reflex; Future  - Testosterone Free and Total; Future  - Adult Sleep Eval & Management  Referral; Future    Daytime sleepiness  As noted above we will get evaluation  - Adult Sleep Eval & Management  Referral; Future    Screen for STD (sexually transmitted disease)  Continue on PrEP therapy has been going well monitoring labs and standing.  - HIV Antigen Antibody Combo; Standing  - Neisseria gonorrhoeae PCR - Clinic Collect; Future  - Chlamydia trachomatis PCR - Clinic Collect; Future  - Chlamydia trachomatis PCR - Clinic Collect  - Neisseria gonorrhoeae PCR - Clinic Collect    Patient Instructions   We can check labs for fatigue type symptoms.     Can come back for morning labs as we discussed.     They should call to set up with sleep- let me know if issues.     Can try the symbicort to help with asthma- let me know if issues with cost on this.     MD Yasir Busby MD  [facility]  CLINIC [facility]    Timmy Myles is a 33 year old, presenting for the following health issues:  Asthma (Concerns about asthma) and Fatigue (Has noticed he is more tired the past couple of months, feels as though he can fall asleep anytime)        6/19/2023     3:21 PM   Additional Questions   Roomed by Area F     Fatigue  Associated symptoms include fatigue.   History of Present Illness       Reason for visit:  Fatigue  Symptom onset:  More than a month  Symptom intensity:  Moderate  Symptom progression:  Staying the same  Had these symptoms before:  No    He eats 2-3 servings of fruits and vegetables daily.He consumes 0 sweetened beverage(s) daily.He exercises with enough effort to increase his heart rate 60 or more minutes per day.  He exercises with enough effort to increase his heart rate 5 days per week.   He is taking medications regularly.     Asthma- feels like going well.       8/10/2021    10:36 AM 2/22/2023     2:11 PM   ACT Total Scores   ACT TOTAL SCORE (Goal Greater than or Equal to 20) 23 25   In the past 12 months, how many times did you visit the emergency room for your asthma without being admitted to the hospital? 0 0   In the past 12 months, how many times were you hospitalized overnight because of your asthma? 0 0     Sleepiness- gets very tired during the day.  - could fall asleep  - snoring at night  - makes noises at night- but does not stop sleeping.   - stop Bang score of 3  - does not feel rested   -Exercise tolerance has been normal.    - possibly worse since COVID  - feels like sleepiness    PrEP therapy has been doing the triple dose around time of potential exposure would like to continue this versus daily.      Wt Readings from Last 4 Encounters:   06/19/23 72.7 kg (160 lb 3.2 oz)   02/27/23 73.2 kg (161 lb 5 oz)   08/17/21 72.3 kg (159 lb 6.4 oz)   08/10/21 73.8 kg (162 lb 11.2 oz)         Review of Systems   Constitutional: Positive for fatigue.      Constitutional,  "HEENT, cardiovascular, pulmonary, gi and gu systems are negative, except as otherwise noted.      Objective    /70 (BP Location: Left arm, Patient Position: Sitting, Cuff Size: Adult Regular)   Pulse 50   Temp 98.4  F (36.9  C) (Oral)   Ht 1.742 m (5' 8.58\")   Wt 72.7 kg (160 lb 3.2 oz)   SpO2 98%   BMI 23.95 kg/m    Body mass index is 23.95 kg/m .  Physical Exam   GENERAL: healthy, alert and no distress  EYES: Eyes grossly normal to inspection, PERRL and conjunctivae and sclerae normal  NECK: no adenopathy, no asymmetry, masses, or scars and thyroid normal to palpation  RESP: lungs clear to auscultation - no rales, rhonchi or wheezes  CV: regular rate and rhythm, normal S1 S2, no S3 or S4, no murmur, click or rub, no peripheral edema and peripheral pulses strong  ABDOMEN: soft, nontender, no hepatosplenomegaly, no masses and bowel sounds normal  MS: no gross musculoskeletal defects noted, no edema  SKIN: no suspicious lesions or rashes  NEURO: Normal strength and tone, mentation intact and speech normal  PSYCH: mentation appears normal, affect normal/bright                    "

## 2023-06-20 LAB
C TRACH DNA SPEC QL NAA+PROBE: NEGATIVE
N GONORRHOEA DNA SPEC QL NAA+PROBE: NEGATIVE

## 2023-06-22 ENCOUNTER — LAB (OUTPATIENT)
Dept: LAB | Facility: CLINIC | Age: 33
End: 2023-06-22
Payer: COMMERCIAL

## 2023-06-22 DIAGNOSIS — Z11.3 SCREEN FOR STD (SEXUALLY TRANSMITTED DISEASE): ICD-10-CM

## 2023-06-22 DIAGNOSIS — R53.83 OTHER FATIGUE: ICD-10-CM

## 2023-06-22 LAB
ALBUMIN SERPL BCG-MCNC: 4.3 G/DL (ref 3.5–5.2)
ALP SERPL-CCNC: 84 U/L (ref 40–129)
ALT SERPL W P-5'-P-CCNC: 17 U/L (ref 0–70)
ANION GAP SERPL CALCULATED.3IONS-SCNC: 10 MMOL/L (ref 7–15)
AST SERPL W P-5'-P-CCNC: 21 U/L (ref 0–45)
BASOPHILS # BLD AUTO: 0 10E3/UL (ref 0–0.2)
BASOPHILS NFR BLD AUTO: 1 %
BILIRUB SERPL-MCNC: 0.7 MG/DL
BUN SERPL-MCNC: 22.3 MG/DL (ref 6–20)
CALCIUM SERPL-MCNC: 9.3 MG/DL (ref 8.6–10)
CHLORIDE SERPL-SCNC: 103 MMOL/L (ref 98–107)
CREAT SERPL-MCNC: 1.07 MG/DL (ref 0.67–1.17)
DEPRECATED HCO3 PLAS-SCNC: 26 MMOL/L (ref 22–29)
EOSINOPHIL # BLD AUTO: 0.1 10E3/UL (ref 0–0.7)
EOSINOPHIL NFR BLD AUTO: 2 %
ERYTHROCYTE [DISTWIDTH] IN BLOOD BY AUTOMATED COUNT: 11.7 % (ref 10–15)
GFR SERPL CREATININE-BSD FRML MDRD: >90 ML/MIN/1.73M2
GLUCOSE SERPL-MCNC: 74 MG/DL (ref 70–99)
HCT VFR BLD AUTO: 48.1 % (ref 40–53)
HGB BLD-MCNC: 17.1 G/DL (ref 13.3–17.7)
HIV 1+2 AB+HIV1 P24 AG SERPL QL IA: NONREACTIVE
IMM GRANULOCYTES # BLD: 0 10E3/UL
IMM GRANULOCYTES NFR BLD: 0 %
LYMPHOCYTES # BLD AUTO: 1.5 10E3/UL (ref 0.8–5.3)
LYMPHOCYTES NFR BLD AUTO: 39 %
MCH RBC QN AUTO: 31.6 PG (ref 26.5–33)
MCHC RBC AUTO-ENTMCNC: 35.6 G/DL (ref 31.5–36.5)
MCV RBC AUTO: 89 FL (ref 78–100)
MONOCYTES # BLD AUTO: 0.4 10E3/UL (ref 0–1.3)
MONOCYTES NFR BLD AUTO: 11 %
NEUTROPHILS # BLD AUTO: 1.9 10E3/UL (ref 1.6–8.3)
NEUTROPHILS NFR BLD AUTO: 47 %
PLATELET # BLD AUTO: 194 10E3/UL (ref 150–450)
POTASSIUM SERPL-SCNC: 4 MMOL/L (ref 3.4–5.3)
PROT SERPL-MCNC: 7.5 G/DL (ref 6.4–8.3)
RBC # BLD AUTO: 5.41 10E6/UL (ref 4.4–5.9)
SHBG SERPL-SCNC: 38 NMOL/L (ref 11–80)
SODIUM SERPL-SCNC: 139 MMOL/L (ref 136–145)
TSH SERPL DL<=0.005 MIU/L-ACNC: 1.92 UIU/ML (ref 0.3–4.2)
WBC # BLD AUTO: 3.9 10E3/UL (ref 4–11)

## 2023-06-22 PROCEDURE — 84403 ASSAY OF TOTAL TESTOSTERONE: CPT

## 2023-06-22 PROCEDURE — 87389 HIV-1 AG W/HIV-1&-2 AB AG IA: CPT

## 2023-06-22 PROCEDURE — 80050 GENERAL HEALTH PANEL: CPT

## 2023-06-22 PROCEDURE — 36415 COLL VENOUS BLD VENIPUNCTURE: CPT

## 2023-06-22 PROCEDURE — 84270 ASSAY OF SEX HORMONE GLOBUL: CPT

## 2023-06-26 DIAGNOSIS — R79.89 LOW TESTOSTERONE IN MALE: Primary | ICD-10-CM

## 2023-06-26 LAB
TESTOST FREE SERPL-MCNC: 6.69 NG/DL
TESTOST SERPL-MCNC: 363 NG/DL (ref 240–950)

## 2023-07-06 ENCOUNTER — LAB (OUTPATIENT)
Dept: LAB | Facility: CLINIC | Age: 33
End: 2023-07-06
Payer: COMMERCIAL

## 2023-07-06 DIAGNOSIS — R79.89 LOW TESTOSTERONE IN MALE: ICD-10-CM

## 2023-07-06 LAB — SHBG SERPL-SCNC: 35 NMOL/L (ref 11–80)

## 2023-07-06 PROCEDURE — 36415 COLL VENOUS BLD VENIPUNCTURE: CPT

## 2023-07-06 PROCEDURE — 84270 ASSAY OF SEX HORMONE GLOBUL: CPT

## 2023-07-06 PROCEDURE — 84403 ASSAY OF TOTAL TESTOSTERONE: CPT

## 2023-07-09 LAB
TESTOST FREE SERPL-MCNC: 12.18 NG/DL
TESTOST SERPL-MCNC: 585 NG/DL (ref 240–950)

## 2023-07-19 ENCOUNTER — MYC MEDICAL ADVICE (OUTPATIENT)
Dept: FAMILY MEDICINE | Facility: CLINIC | Age: 33
End: 2023-07-19
Payer: COMMERCIAL

## 2023-07-19 DIAGNOSIS — L20.9 ATOPIC DERMATITIS, UNSPECIFIED TYPE: Primary | ICD-10-CM

## 2023-07-27 RX ORDER — PRENATAL VIT 91/IRON/FOLIC/DHA 28-975-200
COMBINATION PACKAGE (EA) ORAL 2 TIMES DAILY
Qty: 30 G | Refills: 1 | Status: SHIPPED | OUTPATIENT
Start: 2023-07-27 | End: 2024-03-25

## 2023-08-22 ENCOUNTER — LAB (OUTPATIENT)
Dept: LAB | Facility: CLINIC | Age: 33
End: 2023-08-22
Payer: COMMERCIAL

## 2023-08-22 ENCOUNTER — CARE PLANS (OUTPATIENT)
Dept: FAMILY MEDICINE | Facility: CLINIC | Age: 33
End: 2023-08-22
Payer: COMMERCIAL

## 2023-08-22 DIAGNOSIS — Z20.822 SUSPECTED 2019 NOVEL CORONAVIRUS INFECTION: ICD-10-CM

## 2023-08-22 DIAGNOSIS — Z20.822 SUSPECTED 2019 NOVEL CORONAVIRUS INFECTION: Primary | ICD-10-CM

## 2023-08-22 PROCEDURE — 87635 SARS-COV-2 COVID-19 AMP PRB: CPT

## 2023-08-23 LAB — SARS-COV-2 RNA RESP QL NAA+PROBE: NEGATIVE

## 2023-09-04 ENCOUNTER — MYC MEDICAL ADVICE (OUTPATIENT)
Dept: FAMILY MEDICINE | Facility: CLINIC | Age: 33
End: 2023-09-04
Payer: COMMERCIAL

## 2023-09-23 ENCOUNTER — MYC MEDICAL ADVICE (OUTPATIENT)
Dept: FAMILY MEDICINE | Facility: CLINIC | Age: 33
End: 2023-09-23
Payer: COMMERCIAL

## 2023-09-23 DIAGNOSIS — B35.1 ONYCHOMYCOSIS: Primary | ICD-10-CM

## 2023-09-25 RX ORDER — TERBINAFINE HYDROCHLORIDE 250 MG/1
250 TABLET ORAL DAILY
Qty: 90 TABLET | Refills: 0 | Status: SHIPPED | OUTPATIENT
Start: 2023-09-25 | End: 2023-12-24

## 2023-09-25 ASSESSMENT — ASTHMA QUESTIONNAIRES: ACT_TOTALSCORE: 24

## 2023-09-25 NOTE — TELEPHONE ENCOUNTER
Sending to PCP for review.  Please review and advise on patient MyChart message.    No office visits available except for one approval spot tomorrow am.  Please advise.     Nupur Springer RN  [facility]

## 2023-09-26 ENCOUNTER — LAB (OUTPATIENT)
Dept: LAB | Facility: CLINIC | Age: 33
End: 2023-09-26
Payer: COMMERCIAL

## 2023-09-26 DIAGNOSIS — B35.1 ONYCHOMYCOSIS: ICD-10-CM

## 2023-09-26 DIAGNOSIS — Z11.3 SCREEN FOR STD (SEXUALLY TRANSMITTED DISEASE): ICD-10-CM

## 2023-09-26 LAB
ALBUMIN SERPL BCG-MCNC: 4.2 G/DL (ref 3.5–5.2)
ALP SERPL-CCNC: 109 U/L (ref 40–129)
ALT SERPL W P-5'-P-CCNC: 18 U/L (ref 0–70)
ANION GAP SERPL CALCULATED.3IONS-SCNC: 9 MMOL/L (ref 7–15)
AST SERPL W P-5'-P-CCNC: 25 U/L (ref 0–45)
BILIRUB SERPL-MCNC: 0.4 MG/DL
BUN SERPL-MCNC: 12.7 MG/DL (ref 6–20)
CALCIUM SERPL-MCNC: 9.3 MG/DL (ref 8.6–10)
CHLORIDE SERPL-SCNC: 105 MMOL/L (ref 98–107)
CREAT SERPL-MCNC: 1.01 MG/DL (ref 0.67–1.17)
DEPRECATED HCO3 PLAS-SCNC: 28 MMOL/L (ref 22–29)
EGFRCR SERPLBLD CKD-EPI 2021: >90 ML/MIN/1.73M2
GLUCOSE SERPL-MCNC: 83 MG/DL (ref 70–99)
POTASSIUM SERPL-SCNC: 3.8 MMOL/L (ref 3.4–5.3)
PROT SERPL-MCNC: 7.3 G/DL (ref 6.4–8.3)
SODIUM SERPL-SCNC: 142 MMOL/L (ref 135–145)
T PALLIDUM AB SER QL: NONREACTIVE

## 2023-09-26 PROCEDURE — 87491 CHLMYD TRACH DNA AMP PROBE: CPT

## 2023-09-26 PROCEDURE — 87591 N.GONORRHOEAE DNA AMP PROB: CPT

## 2023-09-26 PROCEDURE — 36415 COLL VENOUS BLD VENIPUNCTURE: CPT

## 2023-09-26 PROCEDURE — 87389 HIV-1 AG W/HIV-1&-2 AB AG IA: CPT

## 2023-09-26 PROCEDURE — 86780 TREPONEMA PALLIDUM: CPT

## 2023-09-26 PROCEDURE — 80053 COMPREHEN METABOLIC PANEL: CPT

## 2023-10-16 ENCOUNTER — ALLIED HEALTH/NURSE VISIT (OUTPATIENT)
Dept: FAMILY MEDICINE | Facility: CLINIC | Age: 33
End: 2023-10-16
Payer: COMMERCIAL

## 2023-10-16 DIAGNOSIS — Z23 NEED FOR COVID-19 VACCINE: Primary | ICD-10-CM

## 2023-10-16 PROCEDURE — 91320 SARSCV2 VAC 30MCG TRS-SUC IM: CPT

## 2023-10-16 PROCEDURE — 90480 ADMN SARSCOV2 VAC 1/ONLY CMP: CPT

## 2023-10-16 PROCEDURE — 99207 PR NO CHARGE NURSE ONLY: CPT

## 2023-10-25 ENCOUNTER — MYC MEDICAL ADVICE (OUTPATIENT)
Dept: FAMILY MEDICINE | Facility: CLINIC | Age: 33
End: 2023-10-25
Payer: COMMERCIAL

## 2023-11-30 DIAGNOSIS — Z29.81 ENCOUNTER FOR COUNSELING BEFORE STARTING AND ABOUT PRE-EXPOSURE PROPHYLAXIS FOR HIV: ICD-10-CM

## 2023-11-30 DIAGNOSIS — Z71.89 ENCOUNTER FOR COUNSELING BEFORE STARTING AND ABOUT PRE-EXPOSURE PROPHYLAXIS FOR HIV: ICD-10-CM

## 2023-11-30 RX ORDER — EMTRICITABINE AND TENOFOVIR DISOPROXIL FUMARATE 200; 300 MG/1; MG/1
1 TABLET, FILM COATED ORAL DAILY
Qty: 90 TABLET | Refills: 3 | Status: SHIPPED | OUTPATIENT
Start: 2023-11-30

## 2023-11-30 NOTE — TELEPHONE ENCOUNTER
Pending Prescriptions:                       Disp   Refills    emtricitabine-tenofovir (TRUVADA) 200-300*90 tab*3            Sig: Take 1 tablet by mouth daily    Pt is calling for a refill on medication. Please review pended medication.

## 2023-12-01 ENCOUNTER — LAB (OUTPATIENT)
Dept: LAB | Facility: CLINIC | Age: 33
End: 2023-12-01
Payer: COMMERCIAL

## 2023-12-01 DIAGNOSIS — B35.1 ONYCHOMYCOSIS: ICD-10-CM

## 2023-12-01 DIAGNOSIS — Z11.3 SCREEN FOR STD (SEXUALLY TRANSMITTED DISEASE): ICD-10-CM

## 2023-12-01 PROCEDURE — 80053 COMPREHEN METABOLIC PANEL: CPT

## 2023-12-01 PROCEDURE — 36415 COLL VENOUS BLD VENIPUNCTURE: CPT

## 2023-12-01 PROCEDURE — 87491 CHLMYD TRACH DNA AMP PROBE: CPT

## 2023-12-01 PROCEDURE — 87389 HIV-1 AG W/HIV-1&-2 AB AG IA: CPT

## 2023-12-01 PROCEDURE — 87591 N.GONORRHOEAE DNA AMP PROB: CPT

## 2023-12-01 PROCEDURE — 86780 TREPONEMA PALLIDUM: CPT

## 2023-12-02 LAB
ALBUMIN SERPL BCG-MCNC: 4.2 G/DL (ref 3.5–5.2)
ALP SERPL-CCNC: 112 U/L (ref 40–150)
ALT SERPL W P-5'-P-CCNC: 26 U/L (ref 0–70)
ANION GAP SERPL CALCULATED.3IONS-SCNC: 8 MMOL/L (ref 7–15)
AST SERPL W P-5'-P-CCNC: 22 U/L (ref 0–45)
BILIRUB SERPL-MCNC: 0.5 MG/DL
BUN SERPL-MCNC: 20.9 MG/DL (ref 6–20)
C TRACH DNA SPEC QL NAA+PROBE: NEGATIVE
CALCIUM SERPL-MCNC: 9.4 MG/DL (ref 8.6–10)
CHLORIDE SERPL-SCNC: 105 MMOL/L (ref 98–107)
CREAT SERPL-MCNC: 1.08 MG/DL (ref 0.67–1.17)
DEPRECATED HCO3 PLAS-SCNC: 27 MMOL/L (ref 22–29)
EGFRCR SERPLBLD CKD-EPI 2021: >90 ML/MIN/1.73M2
GLUCOSE SERPL-MCNC: 86 MG/DL (ref 70–99)
HIV 1+2 AB+HIV1 P24 AG SERPL QL IA: NONREACTIVE
N GONORRHOEA DNA SPEC QL NAA+PROBE: NEGATIVE
POTASSIUM SERPL-SCNC: 4.5 MMOL/L (ref 3.4–5.3)
PROT SERPL-MCNC: 7.5 G/DL (ref 6.4–8.3)
SODIUM SERPL-SCNC: 140 MMOL/L (ref 135–145)
T PALLIDUM AB SER QL: NONREACTIVE

## 2024-01-12 ENCOUNTER — MYC MEDICAL ADVICE (OUTPATIENT)
Dept: FAMILY MEDICINE | Facility: CLINIC | Age: 34
End: 2024-01-12
Payer: COMMERCIAL

## 2024-01-12 DIAGNOSIS — F41.9 ANXIETY: ICD-10-CM

## 2024-01-12 RX ORDER — CITALOPRAM HYDROBROMIDE 20 MG/1
20 TABLET ORAL DAILY
Qty: 90 TABLET | Refills: 0 | Status: SHIPPED | OUTPATIENT
Start: 2024-01-12 | End: 2024-03-25

## 2024-02-25 ENCOUNTER — LAB (OUTPATIENT)
Dept: LAB | Facility: CLINIC | Age: 34
End: 2024-02-25
Payer: COMMERCIAL

## 2024-02-25 DIAGNOSIS — B35.1 ONYCHOMYCOSIS: ICD-10-CM

## 2024-02-25 DIAGNOSIS — Z11.3 SCREEN FOR STD (SEXUALLY TRANSMITTED DISEASE): ICD-10-CM

## 2024-02-25 LAB
ALBUMIN SERPL BCG-MCNC: 4.3 G/DL (ref 3.5–5.2)
ALP SERPL-CCNC: 109 U/L (ref 40–150)
ALT SERPL W P-5'-P-CCNC: 25 U/L (ref 0–70)
ANION GAP SERPL CALCULATED.3IONS-SCNC: 8 MMOL/L (ref 7–15)
AST SERPL W P-5'-P-CCNC: 23 U/L (ref 0–45)
BILIRUB SERPL-MCNC: 0.3 MG/DL
BUN SERPL-MCNC: 21 MG/DL (ref 6–20)
CALCIUM SERPL-MCNC: 9.5 MG/DL (ref 8.6–10)
CHLORIDE SERPL-SCNC: 106 MMOL/L (ref 98–107)
CREAT SERPL-MCNC: 0.97 MG/DL (ref 0.67–1.17)
DEPRECATED HCO3 PLAS-SCNC: 25 MMOL/L (ref 22–29)
EGFRCR SERPLBLD CKD-EPI 2021: >90 ML/MIN/1.73M2
GLUCOSE SERPL-MCNC: 85 MG/DL (ref 70–99)
HIV 1+2 AB+HIV1 P24 AG SERPL QL IA: NONREACTIVE
POTASSIUM SERPL-SCNC: 4.4 MMOL/L (ref 3.4–5.3)
PROT SERPL-MCNC: 7.4 G/DL (ref 6.4–8.3)
SODIUM SERPL-SCNC: 139 MMOL/L (ref 135–145)

## 2024-02-25 PROCEDURE — 80053 COMPREHEN METABOLIC PANEL: CPT

## 2024-02-25 PROCEDURE — 36415 COLL VENOUS BLD VENIPUNCTURE: CPT

## 2024-02-25 PROCEDURE — 87389 HIV-1 AG W/HIV-1&-2 AB AG IA: CPT

## 2024-02-25 PROCEDURE — 86780 TREPONEMA PALLIDUM: CPT

## 2024-02-26 DIAGNOSIS — Z11.3 ROUTINE SCREENING FOR STI (SEXUALLY TRANSMITTED INFECTION): Primary | ICD-10-CM

## 2024-02-26 LAB
C TRACH DNA SPEC QL NAA+PROBE: NEGATIVE
N GONORRHOEA DNA SPEC QL NAA+PROBE: NEGATIVE
T PALLIDUM AB SER QL: NONREACTIVE

## 2024-03-01 ENCOUNTER — MYC MEDICAL ADVICE (OUTPATIENT)
Dept: FAMILY MEDICINE | Facility: CLINIC | Age: 34
End: 2024-03-01
Payer: COMMERCIAL

## 2024-03-01 DIAGNOSIS — Z20.2 SEXUALLY TRANSMITTED DISEASE EXPOSURE: ICD-10-CM

## 2024-03-01 DIAGNOSIS — Z29.81 ENCOUNTER FOR HIV PRE-EXPOSURE PROPHYLAXIS: Primary | ICD-10-CM

## 2024-03-07 NOTE — TELEPHONE ENCOUNTER
Pt called back and was assisted in scheduling RN appt for Tues, Wed or Thurs.  Please route to CAM Pool to initiate approval process.

## 2024-03-07 NOTE — TELEPHONE ENCOUNTER
LVMTCB, to get scheduled on the RN visit. Plan for first week of April, so we have enough time to get everything ready. Anyone can schedule RN visit.

## 2024-03-07 NOTE — TELEPHONE ENCOUNTER
Please help get on RN schedule in at least 2 weeks then can send to CAM pool to start getting approval process started.

## 2024-03-19 SDOH — HEALTH STABILITY: PHYSICAL HEALTH: ON AVERAGE, HOW MANY MINUTES DO YOU ENGAGE IN EXERCISE AT THIS LEVEL?: 40 MIN

## 2024-03-19 SDOH — HEALTH STABILITY: PHYSICAL HEALTH: ON AVERAGE, HOW MANY DAYS PER WEEK DO YOU ENGAGE IN MODERATE TO STRENUOUS EXERCISE (LIKE A BRISK WALK)?: 5 DAYS

## 2024-03-19 ASSESSMENT — ASTHMA QUESTIONNAIRES
ACT_TOTALSCORE: 23
ACT_TOTALSCORE: 23
QUESTION_3 LAST FOUR WEEKS HOW OFTEN DID YOUR ASTHMA SYMPTOMS (WHEEZING, COUGHING, SHORTNESS OF BREATH, CHEST TIGHTNESS OR PAIN) WAKE YOU UP AT NIGHT OR EARLIER THAN USUAL IN THE MORNING: ONCE OR TWICE
QUESTION_1 LAST FOUR WEEKS HOW MUCH OF THE TIME DID YOUR ASTHMA KEEP YOU FROM GETTING AS MUCH DONE AT WORK, SCHOOL OR AT HOME: NONE OF THE TIME
QUESTION_5 LAST FOUR WEEKS HOW WOULD YOU RATE YOUR ASTHMA CONTROL: COMPLETELY CONTROLLED
QUESTION_2 LAST FOUR WEEKS HOW OFTEN HAVE YOU HAD SHORTNESS OF BREATH: NOT AT ALL
QUESTION_4 LAST FOUR WEEKS HOW OFTEN HAVE YOU USED YOUR RESCUE INHALER OR NEBULIZER MEDICATION (SUCH AS ALBUTEROL): ONCE A WEEK OR LESS

## 2024-03-19 ASSESSMENT — SOCIAL DETERMINANTS OF HEALTH (SDOH): HOW OFTEN DO YOU GET TOGETHER WITH FRIENDS OR RELATIVES?: MORE THAN THREE TIMES A WEEK

## 2024-03-25 ENCOUNTER — OFFICE VISIT (OUTPATIENT)
Dept: FAMILY MEDICINE | Facility: CLINIC | Age: 34
End: 2024-03-25
Payer: COMMERCIAL

## 2024-03-25 VITALS
SYSTOLIC BLOOD PRESSURE: 90 MMHG | TEMPERATURE: 97.8 F | OXYGEN SATURATION: 97 % | WEIGHT: 155.2 LBS | HEART RATE: 66 BPM | HEIGHT: 69 IN | DIASTOLIC BLOOD PRESSURE: 60 MMHG | BODY MASS INDEX: 22.99 KG/M2

## 2024-03-25 DIAGNOSIS — Z00.00 ROUTINE HISTORY AND PHYSICAL EXAMINATION OF ADULT: Primary | ICD-10-CM

## 2024-03-25 DIAGNOSIS — J45.20 MILD INTERMITTENT ASTHMA WITHOUT COMPLICATION: ICD-10-CM

## 2024-03-25 DIAGNOSIS — Z20.2 EXPOSURE TO STD: ICD-10-CM

## 2024-03-25 DIAGNOSIS — Z13.220 SCREENING FOR HYPERLIPIDEMIA: ICD-10-CM

## 2024-03-25 DIAGNOSIS — J45.30 MILD PERSISTENT ASTHMA, UNSPECIFIED WHETHER COMPLICATED: ICD-10-CM

## 2024-03-25 DIAGNOSIS — F41.9 ANXIETY: ICD-10-CM

## 2024-03-25 DIAGNOSIS — B00.9 HSV (HERPES SIMPLEX VIRUS) INFECTION: ICD-10-CM

## 2024-03-25 PROCEDURE — 87491 CHLMYD TRACH DNA AMP PROBE: CPT | Performed by: INTERNAL MEDICINE

## 2024-03-25 PROCEDURE — 87591 N.GONORRHOEAE DNA AMP PROB: CPT | Performed by: INTERNAL MEDICINE

## 2024-03-25 PROCEDURE — 99395 PREV VISIT EST AGE 18-39: CPT | Performed by: INTERNAL MEDICINE

## 2024-03-25 PROCEDURE — 99214 OFFICE O/P EST MOD 30 MIN: CPT | Mod: 25 | Performed by: INTERNAL MEDICINE

## 2024-03-25 RX ORDER — PREDNISONE 20 MG/1
40 TABLET ORAL DAILY
Qty: 10 TABLET | Refills: 3 | Status: SHIPPED | OUTPATIENT
Start: 2024-03-25

## 2024-03-25 RX ORDER — DOXYCYCLINE 100 MG/1
200 CAPSULE ORAL
Qty: 30 CAPSULE | Refills: 11 | Status: SHIPPED | OUTPATIENT
Start: 2024-03-25

## 2024-03-25 RX ORDER — CITALOPRAM HYDROBROMIDE 20 MG/1
20 TABLET ORAL DAILY
Qty: 90 TABLET | Refills: 3 | Status: SHIPPED | OUTPATIENT
Start: 2024-03-25

## 2024-03-25 RX ORDER — VALACYCLOVIR HYDROCHLORIDE 1 G/1
1000 TABLET, FILM COATED ORAL 2 TIMES DAILY
Qty: 20 TABLET | Refills: 4 | Status: SHIPPED | OUTPATIENT
Start: 2024-03-25

## 2024-03-25 RX ORDER — BUDESONIDE AND FORMOTEROL FUMARATE DIHYDRATE 80; 4.5 UG/1; UG/1
1-2 AEROSOL RESPIRATORY (INHALATION) EVERY 6 HOURS PRN
Qty: 10.2 G | Refills: 3 | Status: SHIPPED | OUTPATIENT
Start: 2024-03-25

## 2024-03-25 NOTE — PATIENT INSTRUCTIONS
Sent in the doxycycline- can do 200 mg after exposure  Sent in prednisone and symbicort for you.     Let me know if issues come up.    Yasir Mehta MD    Preventive Care Advice   This is general advice given by our system to help you stay healthy. However, your care team may have specific advice just for you. Please talk to your care team about your preventive care needs.  Nutrition  Eat 5 or more servings of fruits and vegetables each day.  Try wheat bread, brown rice and whole grain pasta (instead of white bread, rice, and pasta).  Get enough calcium and vitamin D. Check the label on foods and aim for 100% of the RDA (recommended daily allowance).  Lifestyle  Exercise at least 150 minutes each week   (30 minutes a day, 5 days a week).  Do muscle strengthening activities 2 days a week. These help control your weight and prevent disease.  No smoking.  Wear sunscreen to prevent skin cancer.  Have a dental exam and cleaning every 6 months.  Yearly exams  See your health care team every year to talk about:  Any changes in your health.  Any medicines your care team has prescribed.  Preventive care, family planning, and ways to prevent chronic diseases.  Shots (vaccines)   HPV shots (up to age 26), if you've never had them before.  Hepatitis B shots (up to age 59), if you've never had them before.  COVID-19 shot: Get this shot when it's due.  Flu shot: Get a flu shot every year.  Tetanus shot: Get a tetanus shot every 10 years.  Pneumococcal, hepatitis A, and RSV shots: Ask your care team if you need these based on your risk.  Shingles shot (for age 50 and up).  General health tests  Diabetes screening:  Starting at age 35, Get screened for diabetes at least every 3 years.  If you are younger than age 35, ask your care team if you should be screened for diabetes.  Cholesterol test: At age 39, start having a cholesterol test every 5 years, or more often if advised.  Bone density scan (DEXA): At age 50, ask your care team if  you should have this scan for osteoporosis (brittle bones).  Hepatitis C: Get tested at least once in your life.  STIs (sexually transmitted infections)  Before age 24: Ask your care team if you should be screened for STIs.  After age 24: Get screened for STIs if you're at risk. You are at risk for STIs (including HIV) if:  You are sexually active with more than one person.  You don't use condoms every time.  You or a partner was diagnosed with a sexually transmitted infection.  If you are at risk for HIV, ask about PrEP medicine to prevent HIV.  Get tested for HIV at least once in your life, whether you are at risk for HIV or not.  Cancer screening tests  Cervical cancer screening: If you have a cervix, begin getting regular cervical cancer screening tests at age 21. Most people who have regular screenings with normal results can stop after age 65. Talk about this with your provider.  Breast cancer scan (mammogram): If you've ever had breasts, begin having regular mammograms starting at age 40. This is a scan to check for breast cancer.  Colon cancer screening: It is important to start screening for colon cancer at age 45.  Have a colonoscopy test every 10 years (or more often if you're at risk) Or, ask your provider about stool tests like a FIT test every year or Cologuard test every 3 years.  To learn more about your testing options, visit: https://www.[employer]/505176.pdf.  For help making a decision, visit: https://bit.ly/cv85355.  Prostate cancer screening test: If you have a prostate and are age 55 to 69, ask your provider if you would benefit from a yearly prostate cancer screening test.  Lung cancer screening: If you are a current or former smoker age 50 to 80, ask your care team if ongoing lung cancer screenings are right for you.  For informational purposes only. Not to replace the advice of your health care provider. Copyright   2023 [employer]. All rights reserved. Clinically reviewed by  the [facility] Transitions Program. [employer] 132403 - REV 01/24.

## 2024-03-25 NOTE — PROGRESS NOTES
Preventive Care Visit  [facility]  Yasir Mehta MD, Internal Medicine - Pediatrics  Mar 25, 2024      Assessment & Plan     Mild persistent asthma, unspecified whether complicated  Will continue use of symbicort- discussed can use as rescue as well  - budesonide-formoterol (SYMBICORT) 80-4.5 MCG/ACT Inhaler; Inhale 1-2 puffs into the lungs every 6 hours as needed (wheezing or shortness of breath, use during asthma flare)    Anxiety  Started and feels that helping, refilled, discussed can consider other medications if needed.   - citalopram (CELEXA) 20 MG tablet; Take 1 tablet (20 mg) by mouth daily    Mild intermittent asthma without complication  Give prednisone to have on hand if needed  - predniSONE (DELTASONE) 20 MG tablet; Take 2 tablets (40 mg) by mouth daily    HSV (herpes simplex virus) infection  Continue, having breakouts once per month at times, discussed can consider controller  - valACYclovir (VALTREX) 1000 mg tablet; Take 1 tablet (1,000 mg) by mouth 2 times daily    Exposure to STD  Screening done today, will start doxy prep as well, going to start apretude as well  - doxycycline hyclate (VIBRAMYCIN) 100 MG capsule; Take 2 capsules (200 mg) by mouth once as needed (after exposure prophylaxis)  - NEISSERIA GONORRHOEA PCR; Future  - CHLAMYDIA TRACHOMATIS PCR; Future  - NEISSERIA GONORRHOEA PCR; Future  - CHLAMYDIA TRACHOMATIS PCR; Future  - NEISSERIA GONORRHOEA PCR; Future  - CHLAMYDIA TRACHOMATIS PCR; Future  - HIV Antigen Antibody Combo; Standing  - CHLAMYDIA TRACHOMATIS PCR  - NEISSERIA GONORRHOEA PCR  - CHLAMYDIA TRACHOMATIS PCR  - NEISSERIA GONORRHOEA PCR  - CHLAMYDIA TRACHOMATIS PCR  - NEISSERIA GONORRHOEA PCR    Routine history and physical examination of adult      Screening for hyperlipidemia  - Lipid panel reflex to direct LDL Fasting; Future  - Lipid panel reflex to direct LDL Fasting            Counseling  Appropriate preventive services were discussed with this  patient, including applicable screening as appropriate for fall prevention, nutrition, physical activity, Tobacco-use cessation, weight loss and cognition.  Checklist reviewing preventive services available has been given to the patient.  Reviewed patient's diet, addressing concerns and/or questions.           Timmy Myles is a 33 year old, presenting for the following:  Physical (Annual px. Not fasting. Disucss doxy pep. )        3/25/2024     4:26 PM   Additional Questions   Roomed by Maria PICKETT MA        Health Care Directive  Patient does not have a Health Care Directive or Living Will: Discussed advance care planning with patient; however, patient declined at this time.    HPI      Asthma- using symbicort- using only during days when   - using for exercise-       2/22/2023     2:11 PM 9/25/2023     2:14 PM 3/19/2024     9:31 AM   ACT Total Scores   ACT TOTAL SCORE (Goal Greater than or Equal to 20) 25 24 23   In the past 12 months, how many times did you visit the emergency room for your asthma without being admitted to the hospital? 0 0 0   In the past 12 months, how many times were you hospitalized overnight because of your asthma? 0 0 0     Started citalopram about 1.5 months ago- feels going well.   - feels helping.     Interested in doxy prep therapy, going to start apretude.           3/19/2024   General Health   How would you rate your overall physical health? Good   Feel stress (tense, anxious, or unable to sleep) Not at all         3/19/2024   Nutrition   Three or more servings of calcium each day? Yes   Diet: Regular (no restrictions)   How many servings of fruit and vegetables per day? (!) 2-3   How many sweetened beverages each day? 0-1         3/19/2024   Exercise   Days per week of moderate/strenous exercise 5 days   Average minutes spent exercising at this level 40 min         3/19/2024   Social Factors   Frequency of gathering with friends or relatives More than three times a week   Worry  "food won't last until get money to buy more No   Food not last or not have enough money for food? No   Do you have housing?  Yes   Are you worried about losing your housing? No   Lack of transportation? No   Unable to get utilities (heat,electricity)? No         3/19/2024   Dental   Dentist two times every year? Yes         3/19/2024   TB Screening   Were you born outside of the [place]? No         Today's PHQ-2 Score:       3/25/2024     4:14 PM   PHQ-2 ( 1999 [employer])   Q1: Little interest or pleasure in doing things 0   Q2: Feeling down, depressed or hopeless 0   PHQ-2 Score 0   Q1: Little interest or pleasure in doing things Not at all   Q2: Feeling down, depressed or hopeless Not at all   PHQ-2 Score 0           3/19/2024   Substance Use   Alcohol more than 3/day or more than 7/wk No   Do you use any other substances recreationally? No     Social History     Tobacco Use    Smoking status: Never     Passive exposure: Never    Smokeless tobacco: Never   Vaping Use    Vaping Use: Never used   Substance Use Topics    Alcohol use: Yes    Drug use: Never           3/19/2024   STI Screening   New sexual partner(s) since last STI/HIV test? (!) YES          3/19/2024   Contraception/Family Planning   Questions about contraception or family planning No        Reviewed and updated as needed this visit by Provider   Tobacco  Allergies  Meds  Problems  Med Hx  Surg Hx  Fam Hx                  Review of Systems  Constitutional, neuro, ENT, endocrine, pulmonary, cardiac, gastrointestinal, genitourinary, musculoskeletal, integument and psychiatric systems are negative, except as otherwise noted.     Objective    Exam  BP 90/60 (BP Location: Left arm, Patient Position: Standing, Cuff Size: Adult Regular)   Pulse 66   Temp 97.8  F (36.6  C) (Temporal)   Ht 1.748 m (5' 8.8\")   Wt 70.4 kg (155 lb 3.2 oz)   SpO2 97%   BMI 23.05 kg/m     Estimated body mass index is 23.05 kg/m  as calculated from the following:    Height as " "of this encounter: 1.748 m (5' 8.8\").    Weight as of this encounter: 70.4 kg (155 lb 3.2 oz).    Physical Exam  GENERAL: alert and no distress  EYES: Eyes grossly normal to inspection, PERRL and conjunctivae and sclerae normal  HENT: ear canals and TM's normal, nose and mouth without ulcers or lesions  NECK: no adenopathy, no asymmetry, masses, or scars  RESP: lungs clear to auscultation - no rales, rhonchi or wheezes  CV: regular rate and rhythm, normal S1 S2, no S3 or S4, no murmur, click or rub, no peripheral edema  ABDOMEN: soft, nontender, no hepatosplenomegaly, no masses and bowel sounds normal  MS: no gross musculoskeletal defects noted, no edema  SKIN: no suspicious lesions or rashes  NEURO: Normal strength and tone, mentation intact and speech normal  PSYCH: mentation appears normal, affect normal/bright        Signed Electronically by: Yasir Mehta MD    "

## 2024-03-26 PROBLEM — B00.9 HSV (HERPES SIMPLEX VIRUS) INFECTION: Status: ACTIVE | Noted: 2024-03-26

## 2024-03-26 PROBLEM — F41.9 ANXIETY: Status: ACTIVE | Noted: 2024-03-26

## 2024-03-26 LAB
C TRACH DNA SPEC QL NAA+PROBE: NEGATIVE
C TRACH DNA SPEC QL NAA+PROBE: NEGATIVE
N GONORRHOEA DNA SPEC QL NAA+PROBE: NEGATIVE
N GONORRHOEA DNA SPEC QL NAA+PROBE: NEGATIVE

## 2024-03-27 LAB
C TRACH DNA SPEC QL NAA+PROBE: NEGATIVE
N GONORRHOEA DNA SPEC QL NAA+PROBE: NEGATIVE

## 2024-03-29 ENCOUNTER — LAB (OUTPATIENT)
Dept: LAB | Facility: CLINIC | Age: 34
End: 2024-03-29
Payer: COMMERCIAL

## 2024-03-29 DIAGNOSIS — Z20.2 EXPOSURE TO STD: ICD-10-CM

## 2024-03-29 DIAGNOSIS — Z29.81 ENCOUNTER FOR HIV PRE-EXPOSURE PROPHYLAXIS: ICD-10-CM

## 2024-03-29 PROCEDURE — 87389 HIV-1 AG W/HIV-1&-2 AB AG IA: CPT

## 2024-03-29 PROCEDURE — 87536 HIV-1 QUANT&REVRSE TRNSCRPJ: CPT

## 2024-03-29 PROCEDURE — 36415 COLL VENOUS BLD VENIPUNCTURE: CPT

## 2024-03-30 LAB
HIV 1+2 AB+HIV1 P24 AG SERPL QL IA: NONREACTIVE
HIV1 RNA # PLAS NAA DL=20: NOT DETECTED COPIES/ML

## 2024-04-02 ENCOUNTER — ALLIED HEALTH/NURSE VISIT (OUTPATIENT)
Dept: FAMILY MEDICINE | Facility: CLINIC | Age: 34
End: 2024-04-02
Payer: COMMERCIAL

## 2024-04-02 DIAGNOSIS — Z20.2 EXPOSURE TO STD: Primary | ICD-10-CM

## 2024-04-02 PROCEDURE — 99207 PR NO CHARGE NURSE ONLY: CPT

## 2024-04-02 PROCEDURE — 96372 THER/PROPH/DIAG INJ SC/IM: CPT | Performed by: INTERNAL MEDICINE

## 2024-04-02 NOTE — PROGRESS NOTES
"Clinic Administered Medication Documentation    New concerns today?: No    Ordering Provider: Yasir Mehta MD    Labs (HIV ag/ab plus HIV RNA) completed less than 7 days prior to today's injection? Yes  Last injection date: NA     Has the prior authorization been completed? Yes    Is there an active order (written within the past 365 days, with administrations remaining, not ) in the chart? Yes    Side effects with previous injections? NA    Is patient taking any of the following medications?: No  Carbamazepine, oxcarbazepine, phenobarbital, phenytoin, rifabutin, rifampin, rifapentine.    Estimated body mass index is 23.05 kg/m  as calculated from the following:    Height as of 3/25/24: 1.748 m (5' 8.8\").    Weight as of 3/25/24: 70.4 kg (155 lb 3.2 oz).  Use 2 inch needle if BMI >30kg/m2  Shake the vial vigorously so that the suspension looks uniform before injection. Small air bubbles are expected and acceptable.  Draw up in syringe and administer in the ventrogluteal site.  The dorsogluteal (upper outer quadrant) is an acceptable alternative.  For IM administration only.    Administered Apretude (cabotegravir) to the Dorsogluteal site.  Administered on the right.    Vial/Syringe: Syringe    Next labs: 4 weeks +/- 7 days  Next injection: 4 weeks +/- 7 days  Appointments scheduled: Yes: May 1st.     Pay PAlex RN    "

## 2024-04-10 ENCOUNTER — TELEPHONE (OUTPATIENT)
Dept: FAMILY MEDICINE | Facility: CLINIC | Age: 34
End: 2024-04-10
Payer: COMMERCIAL

## 2024-04-10 DIAGNOSIS — S89.90XA KNEE INJURY, UNSPECIFIED LATERALITY, INITIAL ENCOUNTER: Primary | ICD-10-CM

## 2024-04-10 NOTE — TELEPHONE ENCOUNTER
Order/Referral Request    Who is requesting: patient    Orders being requested: sports medicine provider    Reason service is needed/diagnosis: complication knee injury    When are orders needed by: as soon as able    Has this been discussed with Provider: No    Does patient have a preference on a Group/Provider/Facility? [facility]    Does patient have an appointment scheduled?: No    Where to send orders: Place orders within Epic    Could we send this information to you in [facility] or would you prefer to receive a phone call?:   Patient would prefer a phone call   Okay to leave a detailed message?: Yes at Cell number on file:    Telephone Information:   Mobile [phone]

## 2024-04-10 NOTE — PROGRESS NOTES
ASSESSMENT & PLAN    Shar was seen today for pain.    Diagnoses and all orders for this visit:    Acute pain of right knee  Contusion of right knee, initial encounter  -     Orthopedic  Referral  -     XR Knee Standing AP Bilat [place] Bilat Lat Right; Future        Right knee injury one week ago hitting medial patella on gym equipment. No gross fracture on Xray will await final radiology read. Alignment stable. No effusion and no ligamentous laxity on exam reassuring less likely internal derangement of knee at this time but can consider further imaging if not improving. Most likely contusion with mild quad hematoma. Will recommend weight bearing and exercise as tolerated. Compression can be considered with over the counter knee sleeve. Should progressively improve with rest, ice compression as needed. NSAIDs and tylenol as needed for pain relief.       Patty Luevano DO  [facility]    -----  Chief Complaint   Patient presents with    Right Knee - Pain       SUBJECTIVE  Shar Diaz is a/an 33 year old male who is seen as a self referral for evaluation of right knee pain.     The patient is seen by themselves.    Onset: 1 week(s) ago. Patient describes injury as smacked his knee on a piece of gym equipment. Later had an inability to run as normal.   Location of Pain: right knee, anteriorly. Medial patellar border  Worsened by: running, walking, going down stairs.  Better with: ibuprofen  Treatments tried: ibuprofen  Associated symptoms: aching pain, never had bruising or swelling.   Denies numbness, tingling, locking  Orthopedic/Surgical history: YES - Date: History of runners knee on and off but not recently.   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     Patient Active Problem List   Diagnosis    Mild persistent asthma    Seasonal allergic rhinitis    HSV (herpes simplex virus) infection    Anxiety       Current Outpatient  Medications   Medication Sig Dispense Refill    albuterol (PROAIR HFA/PROVENTIL HFA/VENTOLIN HFA) 108 (90 Base) MCG/ACT inhaler INHALE 1 TO 2 PUFFS BY MOUTH EVERY 4 HOURS AS NEEDED 18 g 5    benzoyl peroxide 5 % external liquid Apply to affected areas once daily as needed. 236 mL 1    budesonide-formoterol (SYMBICORT) 80-4.5 MCG/ACT Inhaler Inhale 1-2 puffs into the lungs every 6 hours as needed (wheezing or shortness of breath, use during asthma flare) 10.2 g 3    citalopram (CELEXA) 20 MG tablet Take 1 tablet (20 mg) by mouth daily 90 tablet 3    doxycycline hyclate (VIBRAMYCIN) 100 MG capsule Take 2 capsules (200 mg) by mouth once as needed (after exposure prophylaxis) 30 capsule 11    emtricitabine-tenofovir (TRUVADA) 200-300 MG per tablet Take 1 tablet by mouth daily 90 tablet 3    loratadine (CLARITIN) 10 MG tablet Take 10 mg by mouth      predniSONE (DELTASONE) 20 MG tablet Take 2 tablets (40 mg) by mouth daily 10 tablet 3    triamcinolone (KENALOG) 0.1 % external ointment Apply topically 2 times daily 30 g 1    valACYclovir (VALTREX) 1000 mg tablet Take 1 tablet (1,000 mg) by mouth 2 times daily 20 tablet 4       PMH, Medications and Allergies were reviewed and updated as needed.    REVIEW OF SYSTEMS:  10 point ROS is negative other than symptoms noted above in HPI        OBJECTIVE:  /62    General: healthy, alert and in no distress  Skin: no suspicious lesions or rash.  CV: distal perfusion intact rle  Resp: normal respiratory effort without conversational dyspnea   Psych: normal mood and affect  Gait: NORMAL  Neuro: Normal light sensory exam of right lower extremity     RIGHT KNEE  Inspection:    Small superficial abrasion medial superior patella of knee  Palpation:    Tender about the superior medial patella. . Remainder of bony and ligamentous landmarks are nontender.    No effusion is present    Patellofemoral crepitus is Absent  Range of Motion:     00 extension to 1200 flexion  Strength:     Quadriceps 5/5    Extensor mechanism intact  Special Tests:    Negative: Patellar grind, MCL/valgus stress (0), LCL/varus stress (0 deg), Lachman's       RADIOLOGY:  Final results and radiologist's interpretation, available in the [facility] health record.  Images were reviewed with the patient in the office today.    My personal interpretation of the performed imaging:   X-rays right knee 3 views reviewed:  Await final radiology read  No acute fracture or deformity.  No joint effusion. Normal  open joint spaces and alignment.                   Disclaimer: This note consists of symbols derived from keyboarding, dictation and/or voice recognition software. As a result, there may be errors in the script that have gone undetected. Please consider this when interpreting information found in this chart.

## 2024-04-11 ENCOUNTER — ANCILLARY PROCEDURE (OUTPATIENT)
Dept: GENERAL RADIOLOGY | Facility: CLINIC | Age: 34
End: 2024-04-11
Attending: STUDENT IN AN ORGANIZED HEALTH CARE EDUCATION/TRAINING PROGRAM
Payer: COMMERCIAL

## 2024-04-11 ENCOUNTER — OFFICE VISIT (OUTPATIENT)
Dept: ORTHOPEDICS | Facility: CLINIC | Age: 34
End: 2024-04-11
Attending: INTERNAL MEDICINE
Payer: COMMERCIAL

## 2024-04-11 VITALS — SYSTOLIC BLOOD PRESSURE: 122 MMHG | DIASTOLIC BLOOD PRESSURE: 62 MMHG

## 2024-04-11 DIAGNOSIS — S80.01XA CONTUSION OF RIGHT KNEE, INITIAL ENCOUNTER: ICD-10-CM

## 2024-04-11 DIAGNOSIS — M25.561 ACUTE PAIN OF RIGHT KNEE: Primary | ICD-10-CM

## 2024-04-11 DIAGNOSIS — M25.561 ACUTE PAIN OF RIGHT KNEE: ICD-10-CM

## 2024-04-11 PROCEDURE — 73560 X-RAY EXAM OF KNEE 1 OR 2: CPT | Mod: TC | Performed by: RADIOLOGY

## 2024-04-11 PROCEDURE — 99203 OFFICE O/P NEW LOW 30 MIN: CPT | Performed by: STUDENT IN AN ORGANIZED HEALTH CARE EDUCATION/TRAINING PROGRAM

## 2024-04-11 PROCEDURE — 73562 X-RAY EXAM OF KNEE 3: CPT | Mod: TC | Performed by: RADIOLOGY

## 2024-04-11 NOTE — PATIENT INSTRUCTIONS
1. Acute pain of right knee    2. Knee injury, unspecified laterality, initial encounter        Please call [phone]  Ask for my team if you have any questions or concerns    Patty Luevano DO  [facility]      Thank you for choosing [facility]!    CLINIC LOCATIONS:     [place]  TRIAGE LINE: [phone] [address] [facility] APPOINTMENTS: [phone]   [place] [address] RADIOLOGY: [phone]   (Monday, Thursday & Friday) PHYSICAL THERAPY: [phone]    BILLING QUESTIONS: [phone]   [place] FAX: [phone] [address]    [place] [address]    (Wednesday)

## 2024-04-11 NOTE — LETTER
4/11/2024         RE: Shar Diaz  [address] [place] [address]        Dear Colleague,    Thank you for referring your patient, Shar Diaz, to the [facility]. Please see a copy of my visit note below.    ASSESSMENT & PLAN    Shar was seen today for pain.    Diagnoses and all orders for this visit:    Acute pain of right knee  Contusion of right knee, initial encounter  -     Orthopedic  Referral  -     XR Knee Standing AP Bilat [place] Bilat Lat Right; Future        Right knee injury one week ago hitting medial patella on gym equipment. No gross fracture on Xray will await final radiology read. Alignment stable. No effusion and no ligamentous laxity on exam reassuring less likely internal derangement of knee at this time but can consider further imaging if not improving. Most likely contusion with mild quad hematoma. Will recommend weight bearing and exercise as tolerated. Compression can be considered with over the counter knee sleeve. Should progressively improve with rest, ice compression as needed. NSAIDs and tylenol as needed for pain relief.       Patty Luevano DO  [facility]    -----  Chief Complaint   Patient presents with     Right Knee - Pain       SUBJECTIVE  Shar Diaz is a/an 33 year old male who is seen as a self referral for evaluation of right knee pain.     The patient is seen by themselves.    Onset: 1 week(s) ago. Patient describes injury as smacked his knee on a piece of gym equipment. Later had an inability to run as normal.   Location of Pain: right knee, anteriorly. Medial patellar border  Worsened by: running, walking, going down stairs.  Better with: ibuprofen  Treatments tried: ibuprofen  Associated symptoms: aching pain, never had bruising or swelling.   Denies numbness, tingling, locking  Orthopedic/Surgical history: YES - Date: History of runners knee on and  off but not recently.   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     Patient Active Problem List   Diagnosis     Mild persistent asthma     Seasonal allergic rhinitis     HSV (herpes simplex virus) infection     Anxiety       Current Outpatient Medications   Medication Sig Dispense Refill     albuterol (PROAIR HFA/PROVENTIL HFA/VENTOLIN HFA) 108 (90 Base) MCG/ACT inhaler INHALE 1 TO 2 PUFFS BY MOUTH EVERY 4 HOURS AS NEEDED 18 g 5     benzoyl peroxide 5 % external liquid Apply to affected areas once daily as needed. 236 mL 1     budesonide-formoterol (SYMBICORT) 80-4.5 MCG/ACT Inhaler Inhale 1-2 puffs into the lungs every 6 hours as needed (wheezing or shortness of breath, use during asthma flare) 10.2 g 3     citalopram (CELEXA) 20 MG tablet Take 1 tablet (20 mg) by mouth daily 90 tablet 3     doxycycline hyclate (VIBRAMYCIN) 100 MG capsule Take 2 capsules (200 mg) by mouth once as needed (after exposure prophylaxis) 30 capsule 11     emtricitabine-tenofovir (TRUVADA) 200-300 MG per tablet Take 1 tablet by mouth daily 90 tablet 3     loratadine (CLARITIN) 10 MG tablet Take 10 mg by mouth       predniSONE (DELTASONE) 20 MG tablet Take 2 tablets (40 mg) by mouth daily 10 tablet 3     triamcinolone (KENALOG) 0.1 % external ointment Apply topically 2 times daily 30 g 1     valACYclovir (VALTREX) 1000 mg tablet Take 1 tablet (1,000 mg) by mouth 2 times daily 20 tablet 4       PMH, Medications and Allergies were reviewed and updated as needed.    REVIEW OF SYSTEMS:  10 point ROS is negative other than symptoms noted above in HPI        OBJECTIVE:  /62    General: healthy, alert and in no distress  Skin: no suspicious lesions or rash.  CV: distal perfusion intact rle  Resp: normal respiratory effort without conversational dyspnea   Psych: normal mood and affect  Gait: NORMAL  Neuro: Normal light sensory exam of right lower extremity     RIGHT KNEE  Inspection:    Small superficial  abrasion medial superior patella of knee  Palpation:    Tender about the superior medial patella. . Remainder of bony and ligamentous landmarks are nontender.    No effusion is present    Patellofemoral crepitus is Absent  Range of Motion:     00 extension to 1200 flexion  Strength:    Quadriceps 5/5    Extensor mechanism intact  Special Tests:    Negative: Patellar grind, MCL/valgus stress (0), LCL/varus stress (0 deg), Lachman's       RADIOLOGY:  Final results and radiologist's interpretation, available in the [facility] health record.  Images were reviewed with the patient in the office today.    My personal interpretation of the performed imaging:   X-rays right knee 3 views reviewed:  Await final radiology read  No acute fracture or deformity.  No joint effusion. Normal  open joint spaces and alignment.                   Disclaimer: This note consists of symbols derived from keyboarding, dictation and/or voice recognition software. As a result, there may be errors in the script that have gone undetected. Please consider this when interpreting information found in this chart.       Again, thank you for allowing me to participate in the care of your patient.        Sincerely,        Patty Luevano, DO

## 2024-04-18 ENCOUNTER — TELEPHONE (OUTPATIENT)
Dept: PSYCHOLOGY | Facility: CLINIC | Age: 34
End: 2024-04-18

## 2024-04-18 NOTE — TELEPHONE ENCOUNTER
Date: 2024    Client Name:  Shar Diaz  Preferred Name: Shar  MRN: [id number]   : 1990  Age:  33 year old    Presenting Problem / Reason for Assessment:     Problems with/lack of sexual desire and frequency, spouse experiencing sexual dysfunction    What is your goal/ hoped-for outcome for services?    To improve these problems    Referring:     Current/Recent Mental Health Providers    Current Mental Health Diagnoses (if any)    Suggested Program:  REST    Interested in Couples/Family Therapy?  Yes: Scheduled for Couples    Any current or past legal concerns we would need to know about?:   No      Patient wishes to be contacted regarding Insurance benefits?:  No    Insurance Benefits to be evaluated.  Note will be entered when validated.    Please Verify Registration    Please send Welcome Packet and document date sent.

## 2024-04-29 ENCOUNTER — LAB (OUTPATIENT)
Dept: LAB | Facility: CLINIC | Age: 34
End: 2024-04-29
Payer: COMMERCIAL

## 2024-04-29 DIAGNOSIS — Z11.3 SCREEN FOR STD (SEXUALLY TRANSMITTED DISEASE): ICD-10-CM

## 2024-04-29 DIAGNOSIS — Z29.81 ENCOUNTER FOR HIV PRE-EXPOSURE PROPHYLAXIS: ICD-10-CM

## 2024-04-29 DIAGNOSIS — Z20.2 EXPOSURE TO STD: ICD-10-CM

## 2024-04-29 DIAGNOSIS — Z00.00 HEALTH CARE MAINTENANCE: Primary | ICD-10-CM

## 2024-04-29 DIAGNOSIS — Z11.3 ROUTINE SCREENING FOR STI (SEXUALLY TRANSMITTED INFECTION): ICD-10-CM

## 2024-04-29 DIAGNOSIS — Z00.00 HEALTH CARE MAINTENANCE: ICD-10-CM

## 2024-04-29 LAB
HOLD SPECIMEN: NORMAL
HOLD SPECIMEN: NORMAL

## 2024-04-29 PROCEDURE — 36415 COLL VENOUS BLD VENIPUNCTURE: CPT

## 2024-04-29 PROCEDURE — 87389 HIV-1 AG W/HIV-1&-2 AB AG IA: CPT

## 2024-04-29 PROCEDURE — 87536 HIV-1 QUANT&REVRSE TRNSCRPJ: CPT

## 2024-04-30 LAB
HIV 1+2 AB+HIV1 P24 AG SERPL QL IA: NONREACTIVE
HIV1 RNA # PLAS NAA DL=20: NOT DETECTED COPIES/ML

## 2024-05-01 ENCOUNTER — ALLIED HEALTH/NURSE VISIT (OUTPATIENT)
Dept: FAMILY MEDICINE | Facility: CLINIC | Age: 34
End: 2024-05-01
Payer: COMMERCIAL

## 2024-05-01 DIAGNOSIS — Z20.2 EXPOSURE TO STD: Primary | ICD-10-CM

## 2024-05-01 PROCEDURE — 96372 THER/PROPH/DIAG INJ SC/IM: CPT | Performed by: INTERNAL MEDICINE

## 2024-05-01 PROCEDURE — 99207 PR NO CHARGE NURSE ONLY: CPT

## 2024-05-01 NOTE — PROGRESS NOTES
"Clinic Administered Medication Documentation    New concerns today?: No    *Asthma flare a couple days after the first dose; PCP is aware. Patient uses inhaler that helps.    Ordering Provider: Dr. Yasir Mehta  Labs (HIV ag/ab plus HIV RNA) completed less than 7 days prior to today's injection? Yes  Last injection date: 24    Has the prior authorization been completed? Yes    Is there an active order (written within the past 365 days, with administrations remaining, not ) in the chart? Yes    Side effects with previous injections? No    Is patient taking any of the following medications?: No  Carbamazepine, oxcarbazepine, phenobarbital, phenytoin, rifabutin, rifampin, rifapentine.      Estimated body mass index is 23.05 kg/m  as calculated from the following:    Height as of 3/25/24: 1.748 m (5' 8.8\").    Weight as of 3/25/24: 70.4 kg (155 lb 3.2 oz).  Use 2 inch needle if BMI >30kg/m2  Shake the vial vigorously so that the suspension looks uniform before injection. Small air bubbles are expected and acceptable.  Draw up in syringe and administer in the ventrogluteal site.  The dorsogluteal (upper outer quadrant) is an acceptable alternative.  For IM administration only.    Administered Apretude (cabotegravir) to the Dorsogluteal site.  Administered on the left.    Vial/Syringe: Single dose vial. Was entire vial of medication used? Yes    Next labs: 8 weeks +/- 7 days  Next injection: 8 weeks +/- 7 days  Appointments scheduled: Yes: 2024 RN Visit      "

## 2024-05-31 ENCOUNTER — TELEPHONE (OUTPATIENT)
Dept: PSYCHOLOGY | Facility: CLINIC | Age: 34
End: 2024-05-31
Payer: COMMERCIAL

## 2024-06-04 ENCOUNTER — OFFICE VISIT (OUTPATIENT)
Dept: PSYCHOLOGY | Facility: CLINIC | Age: 34
End: 2024-06-04
Payer: COMMERCIAL

## 2024-06-04 DIAGNOSIS — F41.1 GAD (GENERALIZED ANXIETY DISORDER): Primary | ICD-10-CM

## 2024-06-04 PROCEDURE — 90791 PSYCH DIAGNOSTIC EVALUATION: CPT | Mod: HN

## 2024-06-04 NOTE — PROGRESS NOTES
"[facility]  Provider Name:  Yolie Vaughn, PhD       PATIENT'S NAME: Shar Diaz  PREFERRED NAME: Shar  PRONOUNS: he/him  MRN: [id number]  : 1990 , Age: 34 year old  DATE OF SERVICE: 24  START TIME AND END TIME:  NUMBER OF MINUTES: 55 min  SERVICE MODALITY:  In-person    [place] ADULT MENTAL HEALTH DIAGNOSTIC ASSESSMENT    Reviewed confidentiality, informed consent, and relevant [facility] policies.    Sources of all information reported below include client self-report through interview, intake paperwork, or chart review unless otherwise noted.     Identifying and Cultural Background Information:  Client's self-reported name and pronoun(s) are used throughout this assessment.     Client is a 34 year old year old, , [place]. Client was referred for an assessment by self .  Client attended the session alone.    Client's cultural/Catholic background is [employer].     Client's preferred language is English and therefore does not require the assistance of an  in therapy.    Client denies serving in the .    Presenting Concern:   The reason for seeking services at this time is: \"difficulties with sexual intimacy and sexual frequency with \". Couple last time had sex together was 2 months ago. Client has a higher sexual drive compared to  and would like to engage in sex more frequently.  reports that sex was never enjoyable to him and struggles to have and maintain erections. Client reports that 's lower sexual desire affects his body-esteem. No other relationship issues were reported. The problem(s) began 7-8 years ago. Client has attempted to resolve these concerns in the past through couples counseling . Client is also medicated with Citalopram for anxiety and had hoped it would decrease his sexual desire, but with no success.    Social/Family History:  [place] and his family was (and still is) " Catholic and conservative. Parents  when he was 3. Client spent most time in childhood/adolescence with his mother. Client has an brother 3 years older, and half-sister 5 years younger, with who is still close. Does not have a relationship with his mother since his coming out.    Developmental history had no significant delays in developmental tasks.      Highest education level was college graduate.    Learning problems: none reported.  Modifications will not be used to assist communication in therapy.       Current relationship status is  for 4 years. Couple is together for 8 years.    Children: zero.    Support system: partner, father, siblings, pets, and friends     Quality of these relationships: good.     Relationship history: Client had relationships with women until his mid 20s. After breaking up with last girlfriend, client began to explore his sexuality and started having sex with men. The relationship with his current partner is the first long-term relationship with a man.     Current living/housing situation: staying in own home/apartment.    Lives with:  and 2 dogs.  Housing is stable.     Employment: employed full time and reports they are able to function appropriately at work..    Finances obtained through: employment.    Client does not identify finances as a current stressor.    Cultural, contextual, and/or socioeconomic factors do not affect client's access to services.       Relationship and Sexual Therapy (REST) Program-Specific Information     1. Sexual behaviors/Functioning    Sexual Frequency: usually less than 1 time a month    Sexual Desire/Interest/Arousal:  Patient denies concern related to sexual interest. This includes partnered activities, fantasies, masturbation.     Erections:   Patient denies difficulties getting/keeping erections during sex.           Orgasm:   Patient denies difficulties orgasming.     Genital Pain:   N/A      2. Relationship History      First sexual experience with partner: Client described first few months of their relationship together as satisfactory, although believes their discrepant sexual desire was present since very early on.    Sexual History: Couple is together 8 years and their sexual activity significantly reduced after a few months. Couple lived together, although client still kept his room for a while. Client felt unable to address their sexual discrepancies and ended having sex outside of the relationship 1 time.  is aware of this. Client carries a lot of shame and guilt around this event, as well as slut shaming for his sexual behaviors. Couple sought couples counseling and it was suggested they tried out to open up their relationship. Initially the couple went to have sex together with other people, until eventually trying to have sex with other people separately. They currently meet with another couple once a month with who they have sex separately.  is not currently pursuing other sexual relationships, but client does with his partner's knowledge.  does not wish to know about details. Their current arrangement dictates they can be with other people, unless it affects their relationship, and therefore the sex client is having outside of the relationship is fleeting and casual only.    Unpleasant or traumatic sexual experiences: None reported.    Pleasurable activities: Client reports that due to feel that his partner does not desire and enjoys sex, that that ends up affecting his sexual experience too. Couple enjoys non-sexual activities together.     Client's Strengths/Protective Factors and Limitations: Strengths or resources that may assist with success in treatment: commitment to health and well being, exercise routine, friends / good social support, insight, and motivation. Factors that may interfere with success in treatment: none identified.     Personal and Family Medical and History/and Current  Medications:    Relevant treatment history (including hospitalizations) of mental health/psychiatric concerns: Client is currently diagnosed with CHAPITO. No other concerns reported.    Relevant history of physical health concerns: Asthma.    Current medications: Citalopram 20 mg.     Medication Adherence: taking prescribed medications as prescribed.    Current (past two week) sleep: difficulties sleeping several days.    Current (past two week) appetite: n/a.    Relevant family history of physical and mental health concerns: father also struggles with CHAPITO.    Substance Use:  Current substance use: Client occasionally uses MDMA (once a month).  Client denies that current substance use is problematic.    Previous substance use history: None reported..    Substance use treatment history: N/A.    Significant Losses / Trauma / Abuse / Neglect Issues:   Client reported that when came out to his family, most of them had negative reactions, especially his parents, although his father re-adjusted and is currently supportive and part of client's life. Client's mother stated at the time that did not want him close to their granddaughter and since then they have been estranged.     Safety Assessment: (C-SSRS short form administered unless the long form was clinically indicated)  Current safety concerns: NONE  [place] Suicide Severity Rating Scale (Short Version): 0  Client denies current homicidal ideation and behaviors.  Client denies current self-injurious ideation and behaviors.      Other safety-risk factors: NONE.    Client reports there are not firearms in the house.     History of Safety Concerns:  Any history of safety concerns not covered above: none.    Diagnostic Criteria: Symptoms reported are anxiety, difficulties relaxing, disturbed sleep, lack of energy; Generalized Anxiety Disorder by history, diagnosed elsewhere.    Generalized Anxiety Disorder  A. Excessive anxiety and worry about a number of events or  activities (such as work or school performance).   B. The person finds it difficult to control the worry.   - Restlessness or feeling keyed up or on edge.    - Being easily fatigued.    - Muscle tension.    - Sleep disturbance (difficulty falling or staying asleep, or restless unsatisfying sleep).   E. The anxiety, worry, or physical symptoms cause clinically significant distress or impairment in social, occupational, or other important areas of functioning.     PHQ-9 TOTAL SCORE: 2  CHAPITO-7 TOTAL SCORE: 2  CAGE-AID: 0  PROMIS TOTAL: 36    Psychiatric Diagnosis:    CHAPITO    Provisional Diagnostic Hypothesis (Explain R/O, other Provisional Diagnosis, and why alternative Diagnosis that were considered were ruled out): Need to assess partner.    Interactive Complexity  Communication difficulties present during the current psychiatric procedure:    None    Recommendations for treatment include:  Family therapy to address discrepant sexual desire

## 2024-06-11 ENCOUNTER — MYC MEDICAL ADVICE (OUTPATIENT)
Dept: FAMILY MEDICINE | Facility: CLINIC | Age: 34
End: 2024-06-11
Payer: COMMERCIAL

## 2024-06-11 DIAGNOSIS — Z20.5 EXPOSURE TO HEPATITIS C: Primary | ICD-10-CM

## 2024-06-14 ENCOUNTER — LAB (OUTPATIENT)
Dept: LAB | Facility: CLINIC | Age: 34
End: 2024-06-14
Payer: COMMERCIAL

## 2024-06-14 DIAGNOSIS — Z20.5 EXPOSURE TO HEPATITIS C: ICD-10-CM

## 2024-06-14 PROCEDURE — 87522 HEPATITIS C REVRS TRNSCRPJ: CPT

## 2024-06-14 PROCEDURE — 36415 COLL VENOUS BLD VENIPUNCTURE: CPT

## 2024-06-14 PROCEDURE — 86803 HEPATITIS C AB TEST: CPT

## 2024-06-15 LAB
HCV AB SERPL QL IA: NONREACTIVE
HCV RNA SERPL NAA+PROBE-ACNC: NOT DETECTED IU/ML

## 2024-06-17 ENCOUNTER — MYC REFILL (OUTPATIENT)
Dept: FAMILY MEDICINE | Facility: CLINIC | Age: 34
End: 2024-06-17
Payer: COMMERCIAL

## 2024-06-17 DIAGNOSIS — F41.9 ANXIETY: ICD-10-CM

## 2024-06-17 RX ORDER — CITALOPRAM HYDROBROMIDE 20 MG/1
20 TABLET ORAL DAILY
Qty: 90 TABLET | Refills: 3 | OUTPATIENT
Start: 2024-06-17

## 2024-06-27 ENCOUNTER — LAB (OUTPATIENT)
Dept: LAB | Facility: CLINIC | Age: 34
End: 2024-06-27
Payer: COMMERCIAL

## 2024-06-27 DIAGNOSIS — Z29.81 ENCOUNTER FOR HIV PRE-EXPOSURE PROPHYLAXIS: ICD-10-CM

## 2024-06-27 DIAGNOSIS — Z11.3 ROUTINE SCREENING FOR STI (SEXUALLY TRANSMITTED INFECTION): ICD-10-CM

## 2024-06-27 DIAGNOSIS — Z20.2 EXPOSURE TO STD: ICD-10-CM

## 2024-06-27 LAB
CHOLEST SERPL-MCNC: 150 MG/DL
FASTING STATUS PATIENT QL REPORTED: NO
HCV AB SERPL QL IA: NONREACTIVE
HDLC SERPL-MCNC: 60 MG/DL
HIV 1+2 AB+HIV1 P24 AG SERPL QL IA: NONREACTIVE
LDLC SERPL CALC-MCNC: 83 MG/DL
NONHDLC SERPL-MCNC: 90 MG/DL
TRIGL SERPL-MCNC: 37 MG/DL

## 2024-06-27 PROCEDURE — 87389 HIV-1 AG W/HIV-1&-2 AB AG IA: CPT

## 2024-06-27 PROCEDURE — 80061 LIPID PANEL: CPT

## 2024-06-27 PROCEDURE — 36415 COLL VENOUS BLD VENIPUNCTURE: CPT

## 2024-06-27 PROCEDURE — 87536 HIV-1 QUANT&REVRSE TRNSCRPJ: CPT

## 2024-06-27 PROCEDURE — 86803 HEPATITIS C AB TEST: CPT

## 2024-06-28 LAB — HIV1 RNA # PLAS NAA DL=20: NOT DETECTED COPIES/ML

## 2024-07-01 ENCOUNTER — ALLIED HEALTH/NURSE VISIT (OUTPATIENT)
Dept: FAMILY MEDICINE | Facility: CLINIC | Age: 34
End: 2024-07-01
Payer: COMMERCIAL

## 2024-07-01 DIAGNOSIS — Z20.2 EXPOSURE TO STD: Primary | ICD-10-CM

## 2024-07-01 PROCEDURE — 96372 THER/PROPH/DIAG INJ SC/IM: CPT | Mod: RT | Performed by: INTERNAL MEDICINE

## 2024-07-01 PROCEDURE — 99207 PR NO CHARGE NURSE ONLY: CPT

## 2024-07-01 NOTE — PROGRESS NOTES
"Clinic Administered Medication Documentation    New concerns today?: No    Ordering Provider: Dr. Mehta  Labs (HIV ag/ab plus HIV RNA) completed less than 7 days prior to today's injection? Yes  Last injection date: 2024    Has the prior authorization been completed? Yes    Is there an active order (written within the past 365 days, with administrations remaining, not ) in the chart? Yes    Side effects with previous injections? No    Is patient taking any of the following medications?: No  Carbamazepine, oxcarbazepine, phenobarbital, phenytoin, rifabutin, rifampin, rifapentine.      Estimated body mass index is 23.05 kg/m  as calculated from the following:    Height as of 3/25/24: 1.748 m (5' 8.8\").    Weight as of 3/25/24: 70.4 kg (155 lb 3.2 oz).  Use 2 inch needle if BMI >30kg/m2  Shake the vial vigorously so that the suspension looks uniform before injection. Small air bubbles are expected and acceptable.  Draw up in syringe and administer in the ventrogluteal site.  The dorsogluteal (upper outer quadrant) is an acceptable alternative.  For IM administration only.    Administered Apretude (cabotegravir) to the Dorsogluteal site.  Administered on the right.    Vial/Syringe: Syringe    Next labs: 8 weeks +/- 7 days  Next injection: 8 weeks +/- 7 days  Appointments scheduled: Yes: 9/3/24, patient will make his own lab appointment.     Chrissy PICKETT RN          "

## 2024-07-23 NOTE — PROGRESS NOTES
I did not personally see the patient. I reviewed and agree with the assessment and plan of this note.     Taylor Wolfe, PhD, LMFT

## 2024-07-30 ENCOUNTER — OFFICE VISIT (OUTPATIENT)
Dept: PSYCHOLOGY | Facility: CLINIC | Age: 34
End: 2024-07-30
Payer: COMMERCIAL

## 2024-07-30 DIAGNOSIS — F41.1 GAD (GENERALIZED ANXIETY DISORDER): Primary | ICD-10-CM

## 2024-07-30 PROCEDURE — 90847 FAMILY PSYTX W/PT 50 MIN: CPT | Mod: HN

## 2024-07-31 NOTE — PROGRESS NOTES
[place] for Sexual and Gender [facility] - Progress Note    Date of Service: 24   Name: Shar Diaz  : 1990  Medical Record Number: [id number]  Treating Provider: Yolie Panchal, PhD  Type of Session: Family with client present  Present in Session: Client and , Ed  Session Start and Stop Time: 5:00-5:55pm  Number of Minutes:  55 min    SERVICE MODALITY:  In-person    DSM-5 Diagnoses:  CHAPITO    Current Reported Symptoms and Status update:  Shar and Ed experience discrepancies in their levels of sexual desire. Shar is the higher desire partner and struggles with anxiety and worry related with not feeling desired, affecting is sexual and body esteem. Ed is the lower desiring partner and fears that Shar might leave him because he does not want to engage in sex as frequently. Ed expressed to require novelty to experience interest and to have kinky desires. The couple has loving and affectionate relationship, but are currently dealing with intimacy difficulties as Ed avoids sex. Both express difficulties and embarrassment related with communicating about sex. The couple has an open relationship, but with the exception of a neighbor couple that they have sex with separately, Shar seems to be only one engaging in sex outside of the relationship.     Areas of treatment focus:  1) Rebuild non-sexual intimacy, including touch.  2) Identify and express sexual desires, including kinks and fantasies.  3) Improve sexual communication.  4) Promote sexual novelty and sexual play.    Changes since last session: The couple has not had sex together since the last session. Ed researched about fraisexuality as suggested in the last session and felt validated to know that other people also struggle with desire for established partners.    Progress Toward Treatment Goals:   Minimal progress     Therapeutic Interventions/Treatment Strategies:    Area(s) of treatment focus addressed in this session  included Interpersonal Relationship Skills and Sexual Health and Wellness. Session focused on identifying needs and difficulties, therapeutic goals and possible treatment paths.    Patient Response:   Patient responded to session by accepting feedback, giving feedback, accepting support, and actively engaged  Possible barriers to participation / learning include: N/A    Current Mental Status Exam:   Appearance:  Appropriate   Eye Contact:  Good   Attitude / Demeanor: Cooperative   Speech      Rate / Production: Normal/ Responsive      Volume:  Normal  volume  Orientation:  All  Mood:   Normal  Affect:   Appropriate   Thought Content: Clear   Insight:   Good       Plan/Need for Future Services:  Return for therapy in 2 weeks to treat diagnosed problems.    Patient has a current master individualized treatment plan.  See Epic treatment plan for more information.    Referral / Collaboration:  Referral to another professional/service is not indicated at this time.  Emergency Services Needed?  No    Assignment:  Be curious.    Interactive Complexity:  There are four specific communication difficulties that complicate the work of the primary psychiatric procedure.  Interactive complexity (+65332) may be reported when at least one of these difficulties is present.    Communication difficulties present during current the psychiatric procedure include:  None.      [place] for Sexual and Gender Health:  Individualized Treatment Plan     Date of Plan: 2024  Name: Shar Daiz MRN: [id number]  : 1990     Date of Creation: 2024    Date Treatment Plan Last Reviewed/Revised: 2024    DSM5 Diagnoses: CHAPITO    Psychosocial / Contextual Factors: Shar and Ed experience discrepancies in their levels of sexual desire. Shar is the higher desire partner and struggles with anxiety and worry related with not feeling desired, affecting is sexual and body esteem. Ed is the lower desiring partner and fears that  "Shar might leave him because he does not want to engage in sex as frequently. Ed expressed to require novelty to experience interest and to have kinky desires. The couple has loving and affectionate relationship, but are currently dealing with intimacy difficulties as Ed avoids sex. Both express difficulties and embarrassment related with communicating about sex. The couple has an open relationship, but with the exception of a neighbor couple that they have sex with separately, Shar seems to be only one engaging in sex outside of the relationship.     Referral / Collaboration:  Referral to another professional/service is not indicated at this time..    Anticipated number of session for this episode of care: 40-60    Anticipation frequency of session: 2-4x monthly    Anticipated Duration of each session: 60 mins    Treatment plan will be reviewed in 180 days or when goals have been changed.    SERVICE MODALITY:  In-person    Impact of Symptoms on Function:  Decreased Social/Family Function    Sexual Problems:  DISCREPANT SEXUAL DESIRE    Gender Concerns:  None    Client Strengths:  caring, educated, empathetic, has a previous history of therapy, motivated, open to learning, and open to suggestions / feedback    Client Participation in Plan:  Contributed to goals and plan     Areas of Vulnerability:  Anxiety    Long-Term Goals:  Knowledge about illness and management of symptoms     Discharge Criteria:  Satisfactory progress toward treatment goals      Areas of Treatment Focus     Why are you seeking treatment/What do you want to focus on during treatment? \"Discrepant sexual desire\"       Area of Treatment Focus:   Symptom Stabilization and Management  Start Date:    07/30/2024    Goal: Target Date: 1 year Status: Active    1) Rebuild non-sexual intimacy, including touch.  2) Identify and express sexual desires, including kinks and fantasies.  3) Improve sexual communication.  4) Promote sexual novelty and " sexual play.        Progress: Satisfactory. Couple is invested in therapy and open to communicate more openly about sex.           Treatment Strategies:     1) Sensate focus and/or other intimacy building exercises.  2) Kink education, gradual exploration, and normalization.  3) Structured communication exercises.       Intervention(s):    Therapist will: 1) provide resources; 2) help clients plan date events and sexual play 3) explore previous sex ed and fill gaps; 3) facilitate communication and vulnerability; 5) support and guide clients as they complete therapeutic exercises and explore new erotic avenues.         See treatment plan signature page for patient and provider signature     The Individualized Treatment Plan Signature Page has been routed to the provider for co-sign (as required).    Yolie Vaughn, PhD         I did not personally see the patient. I reviewed and agree with the assessment and plan of this note.       Merced Lynn, PhD, LP    Program in Human Sexuality, [facility]  Department of Family Medicine and Community Health  [facility]

## 2024-08-13 ENCOUNTER — OFFICE VISIT (OUTPATIENT)
Dept: PSYCHOLOGY | Facility: CLINIC | Age: 34
End: 2024-08-13
Payer: COMMERCIAL

## 2024-08-13 DIAGNOSIS — F41.1 GAD (GENERALIZED ANXIETY DISORDER): Primary | ICD-10-CM

## 2024-08-13 PROCEDURE — 90847 FAMILY PSYTX W/PT 50 MIN: CPT | Mod: HN

## 2024-08-13 NOTE — PROGRESS NOTES
[place] for Sexual and Gender Health - Progress Note    Date of Service: 24   Name: Shar Diaz  : 1990  Medical Record Number: [id number]  Treating Provider: Yolie Panchal, PhD  Type of Session: Family with client present  Present in Session: Client and , Ed  Session Start and Stop Time: 3:00-3:55pm  Number of Minutes:  55 min    SERVICE MODALITY:  In-person    DSM-5 Diagnoses:  CHAPITO    Current Reported Symptoms and Status update:  Shar and Ed experience discrepancies in their levels of sexual desire. Shar is the higher desire partner and struggles with anxiety and worry related with not feeling desired, affecting is sexual and body esteem. Ed is the lower desiring partner and fears that Shar might leave him because he does not want to engage in sex as frequently. Ed expressed to require novelty to experience interest and to have kinky desires. The couple has loving and affectionate relationship, but are currently dealing with intimacy difficulties as Ed avoids sex. Both express difficulties and embarrassment related with communicating about sex. The couple has an open relationship, but with the exception of a neighbor couple that they have sex with separately, Shar seems to be only one engaging in sex outside of the relationship.     Areas of treatment focus:  1) Rebuild non-sexual intimacy, including touch.  2) Identify and express sexual desires, including kinks and fantasies.  3) Improve sexual communication.  4) Promote sexual novelty and sexual play.    Changes since last session: Couple travelled to [place] to attend queer events with their neighbor friends. When went out, Shar mainly danced with Kyree, and Ed chatted with Anita.    Progress Toward Treatment Goals:   Minimal progress     Therapeutic Interventions/Treatment Strategies:    Area(s) of treatment focus addressed in this session included Interpersonal Relationship Skills and Sexual Health and Wellness.  "Explored how the couple was reconnecting after enjoying separate interactions and discussed ways to promote more togetherness after these through shared rituals or activities. Ed expressed his insecurities about Kyree, and how fears that Shar might leave him because \"he has not much to offer\". Shar secured Ed and told him this was not true. The couple identified several shared activities for date nights and \"reconnecting rituals\".    Patient Response:   Patient responded to session by accepting feedback, giving feedback, accepting support, and actively engaged  Possible barriers to participation / learning include: N/A    Current Mental Status Exam:   Appearance:  Appropriate   Eye Contact:  Good   Attitude / Demeanor: Cooperative   Speech      Rate / Production: Normal/ Responsive      Volume:  Normal  volume  Orientation:  All  Mood:   Normal  Affect:   Appropriate   Thought Content: Clear   Insight:   Good       Plan/Need for Future Services:  Return for therapy in 2 weeks to treat diagnosed problems.    Patient has a current master individualized treatment plan.  See Epic treatment plan for more information.    Referral / Collaboration:  Referral to another professional/service is not indicated at this time.  Emergency Services Needed?  No    Assignment:  NEW: Establish brief reconnecting ritual after a \"separate\" night out, and longer reconnecting ritual for when spend time apart.  Ongoing: Being curious about their partner's sexual interests.    Interactive Complexity:  There are four specific communication difficulties that complicate the work of the primary psychiatric procedure.  Interactive complexity (+03003) may be reported when at least one of these difficulties is present.    Communication difficulties present during current the psychiatric procedure include:  None.        Yolie Vaughn, PhD         I did not personally see the patient. I reviewed and agree with the assessment and plan of " this note.       Merced Lynn, PhD, LP    Program in Human Sexuality, [facility]  Department of Family Medicine and Community Health  [facility]

## 2024-08-15 ENCOUNTER — ALLIED HEALTH/NURSE VISIT (OUTPATIENT)
Dept: FAMILY MEDICINE | Facility: CLINIC | Age: 34
End: 2024-08-15
Payer: COMMERCIAL

## 2024-08-15 DIAGNOSIS — J02.9 SORE THROAT: Primary | ICD-10-CM

## 2024-08-15 DIAGNOSIS — R09.81 NASAL CONGESTION: Primary | ICD-10-CM

## 2024-08-15 LAB — SARS-COV-2 RNA RESP QL NAA+PROBE: POSITIVE

## 2024-08-15 PROCEDURE — 99207 PR NO CHARGE NURSE ONLY: CPT

## 2024-08-15 PROCEDURE — 87635 SARS-COV-2 COVID-19 AMP PRB: CPT

## 2024-08-16 DIAGNOSIS — U07.1 COVID: Primary | ICD-10-CM

## 2024-08-17 ENCOUNTER — TELEPHONE (OUTPATIENT)
Dept: NURSING | Facility: CLINIC | Age: 34
End: 2024-08-17
Payer: COMMERCIAL

## 2024-08-17 NOTE — TELEPHONE ENCOUNTER
Coronavirus (COVID-19) Notification    Caller Name (Patient, parent, daughter/son, grandparent, etc)   Patient     Reason for call  Notify of Positive Coronavirus (COVID-19) lab results, assess symptoms,  review [facility] recommendations    Lab Result    Lab test:  2019-nCoV rRt-PCR or SARS-CoV-2 PCR    Oropharyngeal AND/OR nasopharyngeal swabs is POSITIVE for 2019-nCoV RNA/SARS-COV-2 PCR (COVID-19 virus)      Gather patient reported symptoms   Assessment   Current Symptoms at time of phone call, reported by patient: (if no symptoms, document: No symptoms]   Put patient on medication    Date of symptom(s) onset (if applicable)  Started medication     If at time of call, Patients symptoms have worsened, the Patient should contact 911 or have someone drive them to Emergency Dept promptly:    If Patient calling 911, inform 911 personal that you have tested positive for the Coronavirus (COVID-19).  Place mask on and await 911 to arrive.  If Emergency Dept, If possible, please have another adult drive you to the Emergency Dept but you need to wear mask when in contact with other people.      Treatment Options:   Is patient interested in discussing COVID treatment? No.        Review information with Patient    Your result was positive. This means you have COVID-19 (coronavirus).    How can I protect others?    These guidelines are for isolating before returning to work, school or .    If you DO have symptoms  Stay home and away from others   For at least 5 days after your symptoms started, AND  You are fever free for 24 hours (with no medicine that reduces fever), AND  Your other symptoms are better  Wear a mask for 10 full days anytime you are around others    If you DON'T have symptoms  Stay home and away from others for at least 5 days after your positive test  Wear a mask for 10 full days anytime you are around others    There may be different guidelines for healthcare facilities.  Please check with the  specific sites before arriving.    If you have been told by a doctor that you were severely ill with COVID-19 or are immunocompromised, you should isolate for at least 10 days.    You should not go back to work until you meet the guidelines above for ending your home isolation. You don't need to be retested for COVID-19 before going back to work--studies show that you won't spread the virus if it's been at least 10 days since your symptoms started (or 20 days, if you have a weak immune system).    Employers, schools, and daycares: This is an official notice for this person's medical guidelines for returning in-person.  They must meet the above guidelines before going back to work, school or  in person.    You will receive a positive COVID-19 letter via [employer] or the mail soon with additional self-care information.    Would you like me to review some of that information with you now?  No    If you were tested for an upcoming procedure, please contact your provider for next steps.    Gabriela Go

## 2024-08-20 ENCOUNTER — VIRTUAL VISIT (OUTPATIENT)
Dept: PSYCHOLOGY | Facility: CLINIC | Age: 34
End: 2024-08-20
Payer: COMMERCIAL

## 2024-08-20 DIAGNOSIS — F41.1 GAD (GENERALIZED ANXIETY DISORDER): Primary | ICD-10-CM

## 2024-08-20 PROCEDURE — 90847 FAMILY PSYTX W/PT 50 MIN: CPT | Mod: 95

## 2024-08-20 NOTE — NURSING NOTE
Current patient location: [address] [place] [address]    Is the patient currently in the state of [place]? YES    Visit mode:VIDEO    If the visit is dropped, the patient can be reconnected by: VIDEO VISIT: Text to cell phone:   Telephone Information:   Mobile [phone]    and VIDEO VISIT: Send to e-mail at: [email or web address]    Will anyone else be joining the visit? NO  (If patient encounters technical issues they should call [phone]    How would you like to obtain your AVS? MyChart    Are changes needed to the allergy or medication list? N/A    Are refills needed on medications prescribed by this physician? NO    Rooming Documentation:  Questionnaire(s) not done per department protocol      Reason for visit: RECHECK    Daron VIEIRA

## 2024-08-20 NOTE — PROGRESS NOTES
SERVICE MODALITY:  In-person      .[facility] - Progress Note    Date of Service: 24   Name: Shar Diaz  : 1990  Medical Record Number: [id number]  Treating Provider: Yolie Panchal, PhD  Type of Session: Family with client present  Present in Session: Client and , Ed  Session Start and Stop Time: 5:00-5:55pm  Number of Minutes:  55 min    SERVICE MODALITY:  In-person    DSM-5 Diagnoses:  CHAPITO    Current Reported Symptoms and Status update:  Shar and Ed experience discrepancies in their levels of sexual desire. Shar is the higher desire partner and struggles with anxiety and worry related with not feeling desired, affecting is sexual and body esteem. Ed is the lower desiring partner and fears that Shar might leave him because he does not want to engage in sex as frequently. Ed expressed to require novelty to experience interest and to have kinky desires. The couple has loving and affectionate relationship, but are currently dealing with intimacy difficulties as Ed avoids sex. Both express difficulties and embarrassment related with communicating about sex. The couple has an open relationship, but with the exception of a neighbor couple that they have sex with separately, Shar seems to be only one engaging in sex outside of the relationship.     Areas of treatment focus:  1) Rebuild non-sexual intimacy, including touch.  2) Identify and express sexual desires, including kinks and fantasies.  3) Improve sexual communication.  4) Promote sexual novelty and sexual play.    Changes since last session: Couple established reconnecting ritual after going out consisting of getting massages in the mall and then browse around and talk.     Progress Toward Treatment Goals:   Satisfactory progress.    Therapeutic Interventions/Treatment Strategies:    Area(s) of treatment focus addressed in this session included Interpersonal Relationship Skills and Sexual  Health and Wellness. Discussed ways to implement ritual and rules, including topics that could be talked about during the reconnecting ritual. Clinician described sensate focus exercises and answered the couples questions. They were both excited and motivated to try. Ed expressed that would like that they discuss in therapy ground rules and concerns before they go on cruise ship in October. This was welcomed by everyone.    Patient Response:   Patient responded to session by accepting feedback, giving feedback, accepting support, and actively engaged  Possible barriers to participation / learning include: N/A    Current Mental Status Exam:   Appearance:  Appropriate   Eye Contact:  Good   Attitude / Demeanor: Cooperative   Speech      Rate / Production: Normal/ Responsive      Volume:  Normal  volume  Orientation:  All  Mood:   Normal  Affect:   Appropriate   Thought Content: Clear   Insight:   Good       Plan/Need for Future Services:  Return for therapy in 2 weeks to treat diagnosed problems.    Patient has a current master individualized treatment plan.  See Epic treatment plan for more information.    Referral / Collaboration:  Referral to another professional/service is not indicated at this time.  Emergency Services Needed?  No    Assignment:  Ongoing: Going to get massages at the mall and talk while walk around after going out (reconnecting ritual)   Ongoing: Being curious about their partner's sexual interests.    Interactive Complexity:  There are four specific communication difficulties that complicate the work of the primary psychiatric procedure.  Interactive complexity (+19653) may be reported when at least one of these difficulties is present.    Communication difficulties present during current the psychiatric procedure include:  None.        Yolie Vaughn, PhD         I did not personally see the patient. I reviewed and agree with the assessment and plan of this note.       Merced SMITH  Shane, PhD, LP    Program in Human Sexuality, [facility]  Department of Family Medicine and Community Health  [facility]

## 2024-09-04 ENCOUNTER — OFFICE VISIT (OUTPATIENT)
Dept: PSYCHOLOGY | Facility: CLINIC | Age: 34
End: 2024-09-04
Payer: COMMERCIAL

## 2024-09-04 ENCOUNTER — MYC MEDICAL ADVICE (OUTPATIENT)
Dept: FAMILY MEDICINE | Facility: CLINIC | Age: 34
End: 2024-09-04

## 2024-09-04 ENCOUNTER — LAB (OUTPATIENT)
Dept: LAB | Facility: CLINIC | Age: 34
End: 2024-09-04
Payer: COMMERCIAL

## 2024-09-04 DIAGNOSIS — Z29.81 ENCOUNTER FOR HIV PRE-EXPOSURE PROPHYLAXIS: ICD-10-CM

## 2024-09-04 DIAGNOSIS — F41.9 ANXIETY: Primary | ICD-10-CM

## 2024-09-04 DIAGNOSIS — Z11.3 SCREENING EXAMINATION FOR VENEREAL DISEASE: ICD-10-CM

## 2024-09-04 DIAGNOSIS — Z11.3 SCREENING EXAMINATION FOR VENEREAL DISEASE: Primary | ICD-10-CM

## 2024-09-04 DIAGNOSIS — Z20.2 EXPOSURE TO STD: ICD-10-CM

## 2024-09-04 LAB — T PALLIDUM AB SER QL: NONREACTIVE

## 2024-09-04 PROCEDURE — 87491 CHLMYD TRACH DNA AMP PROBE: CPT | Mod: 59

## 2024-09-04 PROCEDURE — 87389 HIV-1 AG W/HIV-1&-2 AB AG IA: CPT

## 2024-09-04 PROCEDURE — 87491 CHLMYD TRACH DNA AMP PROBE: CPT

## 2024-09-04 PROCEDURE — 87536 HIV-1 QUANT&REVRSE TRNSCRPJ: CPT

## 2024-09-04 PROCEDURE — 87591 N.GONORRHOEAE DNA AMP PROB: CPT | Mod: 59

## 2024-09-04 PROCEDURE — 90847 FAMILY PSYTX W/PT 50 MIN: CPT | Mod: HN

## 2024-09-04 PROCEDURE — 36415 COLL VENOUS BLD VENIPUNCTURE: CPT

## 2024-09-04 PROCEDURE — 86780 TREPONEMA PALLIDUM: CPT

## 2024-09-04 PROCEDURE — 87591 N.GONORRHOEAE DNA AMP PROB: CPT

## 2024-09-04 NOTE — TELEPHONE ENCOUNTER
Ordered testing please help get on MA only schedule at the same time to do self rectal swab and to have MA do the throat swab.

## 2024-09-05 LAB
C TRACH DNA SPEC QL PROBE+SIG AMP: NEGATIVE
HIV 1+2 AB+HIV1 P24 AG SERPL QL IA: NONREACTIVE
HIV1 RNA # PLAS NAA DL=20: NOT DETECTED COPIES/ML
N GONORRHOEA DNA SPEC QL NAA+PROBE: NEGATIVE

## 2024-09-05 NOTE — PROGRESS NOTES
[facility] [facility] - Progress Note    Date of Service: 24   Name: Shar Diaz  : 1990  Medical Record Number: [id number]  Treating Provider: Yolie Panchal, PhD  Type of Session: Family with client present  Present in Session: Client and , Ed  Session Start and Stop Time: 4:00-4:55pm  Number of Minutes:  55 min    SERVICE MODALITY:  In-person    DSM-5 Diagnoses:  Anxiety    Current Reported Symptoms and Status update:  Shar and Ed experience discrepancies in their levels of sexual desire. Shar is the higher desire partner and struggles with anxiety and worry related with not feeling desired, affecting is sexual and body esteem. Ed is the lower desiring partner and fears that Shar might leave him because he does not want to engage in sex as frequently. Ed expressed to require novelty to experience interest and to have kinky desires. The couple has loving and affectionate relationship, but are currently dealing with intimacy difficulties as dE avoids sex. Both express difficulties and embarrassment related with communicating about sex. The couple has an open relationship, but with the exception of a neighbor couple that they have sex with separately, Shar seems to be only one engaging in sex outside of the relationship.     Areas of treatment focus:  1) Rebuild non-sexual intimacy, including touch.  2) Identify and express sexual desires, including kinks and fantasies.  3) Improve sexual communication.  4) Promote sexual novelty and sexual play.    Changes since last session: Shar went camping with extra-dyadic partner. Couple reconnected trough the pre-arranged ritual and enjoyed this. Couple engaged in sensate focus I in two occasions.    Progress Toward Treatment Goals:   Satisfactory progress.    Therapeutic Interventions/Treatment Strategies:    Area(s) of treatment focus addressed in this session included Interpersonal Relationship Skills and Sexual  Health and Wellness.  Explored feelings and experience of sensate focus. Both reported positive and unexpected experiences the 2nd time. Felt awkward on 1st attempt. Shar felt sexy and desired and Ed felt that was present. Discussed importance of embodiment and mindfulness during sexual interactions and reflected on the role of performance anxiety, distraction, and lack of pleasure in low sexual desire.    Patient Response:   Patient responded to session by accepting feedback, giving feedback, accepting support, and actively engaged  Possible barriers to participation / learning include: N/A    Current Mental Status Exam:   Appearance:  Appropriate   Eye Contact:  Good   Attitude / Demeanor: Cooperative   Speech      Rate / Production: Normal/ Responsive      Volume:  Normal  volume  Orientation:  All  Mood:   Normal  Affect:   Appropriate   Thought Content: Clear   Insight:   Good       Plan/Need for Future Services:  Return for therapy in 2 weeks to treat diagnosed problems.    Patient has a current master individualized treatment plan.  See Epic treatment plan for more information.    Referral / Collaboration:  Referral to another professional/service is not indicated at this time.  Emergency Services Needed?  No    Assignment:  NEW: Invest quality time during meal time and prep.  Ongoing: Going to get massages at the mall and talk while walk around after going out (reconnecting ritual)   Ongoing: Being curious about their partner's sexual interests.    Interactive Complexity:  There are four specific communication difficulties that complicate the work of the primary psychiatric procedure.  Interactive complexity (+50431) may be reported when at least one of these difficulties is present.    Communication difficulties present during current the psychiatric procedure include:  None.        Yolie Vaughn, PhD         I did not personally see the patient. I reviewed and agree with the assessment and plan of  this note.       Merced Lynn, PhD, LP    Program in Human Sexuality, [facility]  Department of Family Medicine and Community Health  [facility]

## 2024-09-06 ENCOUNTER — ALLIED HEALTH/NURSE VISIT (OUTPATIENT)
Dept: FAMILY MEDICINE | Facility: CLINIC | Age: 34
End: 2024-09-06
Payer: COMMERCIAL

## 2024-09-06 DIAGNOSIS — Z20.2 SEXUALLY TRANSMITTED DISEASE EXPOSURE: Primary | ICD-10-CM

## 2024-09-06 PROCEDURE — 99207 PR NO CHARGE NURSE ONLY: CPT

## 2024-09-06 PROCEDURE — 96372 THER/PROPH/DIAG INJ SC/IM: CPT | Performed by: INTERNAL MEDICINE

## 2024-09-06 NOTE — PROGRESS NOTES
"Clinic Administered Medication Documentation      Clinic Administered Medication Documentation    New concerns today?: No    Ordering Provider: Yasir Mehta  Labs (HIV ag/ab plus HIV RNA) completed less than 7 days prior to today's injection? Yes  Last injection date:     Has the prior authorization been completed? Yes    Is there an active order (written within the past 365 days, with administrations remaining, not ) in the chart? Yes    Side effects with previous injections? No. Noted some soreness at last injection but nothing new    Is patient taking any of the following medications?: No  Carbamazepine, oxcarbazepine, phenobarbital, phenytoin, rifabutin, rifampin, rifapentine.      Estimated body mass index is 23.05 kg/m  as calculated from the following:    Height as of 3/25/24: 1.748 m (5' 8.8\").    Weight as of 3/25/24: 70.4 kg (155 lb 3.2 oz).  Use 2 inch needle if BMI >30kg/m2  Shake the vial vigorously so that the suspension looks uniform before injection. Small air bubbles are expected and acceptable.  Draw up in syringe and administer in the ventrogluteal site.  The dorsogluteal (upper outer quadrant) is an acceptable alternative.  For IM administration only.    Administered Apretude (cabotegravir) to the Dorsogluteal site.  Administered on the right.    Vial/Syringe: Syringe    Next labs: 8 weeks +/- 7 days  Next injection: 8 weeks +/- 7 days  Appointments scheduled: Yes: Patient will make his own lab appointment    "

## 2024-09-10 ENCOUNTER — OFFICE VISIT (OUTPATIENT)
Dept: PSYCHOLOGY | Facility: CLINIC | Age: 34
End: 2024-09-10
Payer: COMMERCIAL

## 2024-09-10 DIAGNOSIS — F41.9 ANXIETY: Primary | ICD-10-CM

## 2024-09-10 PROCEDURE — 90847 FAMILY PSYTX W/PT 50 MIN: CPT | Mod: HN

## 2024-09-10 NOTE — PROGRESS NOTES
[facility] - Progress Note    Date of Service: 9/10/24   Name: Shar Diaz  : 1990  Medical Record Number: [id number]  Treating Provider: Yolie Panchal, PhD  Type of Session: Family with client present  Present in Session: Client and , Ed  Session Start and Stop Time: 4:00-4:55pm  Number of Minutes:  55 min    SERVICE MODALITY:  In-person    DSM-5 Diagnoses:  Anxiety    Current Reported Symptoms and Status update:  Shar and Ed experience discrepancies in their levels of sexual desire. Shar is the higher desire partner and struggles with anxiety and worry related with not feeling desired, affecting is sexual and body esteem. Ed is the lower desiring partner and fears that Shar might leave him because he does not want to engage in sex as frequently. Ed expressed to require novelty to experience interest and to have kinky desires. The couple has loving and affectionate relationship, but are currently dealing with intimacy difficulties as Ed avoids sex. Both express difficulties and embarrassment related with communicating about sex. The couple has an open relationship, but with the exception of a neighbor couple that they have sex with separately, Shar seems to be only one engaging in sex outside of the relationship.     Areas of treatment focus:  1) Rebuild non-sexual intimacy, including touch.  2) Identify and express sexual desires, including kinks and fantasies.  3) Improve sexual communication.  4) Promote sexual novelty and sexual play.    Changes since last session: Couple continues practicing sensate focus I with positive results. Reported to be more comfortable with nudity and intimacy and that have interacted sexually twice. Ed is trying to loose weight for their upcoming cruise.    Progress Toward Treatment Goals:   Satisfactory progress.    Therapeutic Interventions/Treatment Strategies:    Area(s) of treatment focus addressed in this session  included Interpersonal Relationship Skills and Sexual Health and Wellness.  Explored feelings related to body-esteem and how these affected sexual interactions. Discussed expectations about upcoming cruise and explored ways to maximize intimacy and reconnecting opportunities during the vacation.    Patient Response:   Patient responded to session by accepting feedback, giving feedback, accepting support, and actively engaged  Possible barriers to participation / learning include: N/A    Current Mental Status Exam:   Appearance:  Appropriate   Eye Contact:  Good   Attitude / Demeanor: Cooperative   Speech      Rate / Production: Normal/ Responsive      Volume:  Normal  volume  Orientation:  All  Mood:   Normal  Affect:   Appropriate   Thought Content: Clear   Insight:   Good       Plan/Need for Future Services:  Return for therapy in 2 weeks to treat diagnosed problems.    Patient has a current master individualized treatment plan.  See Epic treatment plan for more information.    Referral / Collaboration:  Referral to another professional/service is not indicated at this time.  Emergency Services Needed?  No    Assignment:  NEW: Invest quality time during meal time and prep.  Ongoing: Going to get massages at the mall and talk while walk around after going out (reconnecting ritual)   Ongoing: Being curious about their partner's sexual interests.    Interactive Complexity:  There are four specific communication difficulties that complicate the work of the primary psychiatric procedure.  Interactive complexity (+64287) may be reported when at least one of these difficulties is present.    Communication difficulties present during current the psychiatric procedure include:  None.        Yolie Vaughn, PhD         I did not personally see the patient. I reviewed and agree with the assessment and plan of this note.       Merced Lynn, PhD, LP    Program in Human Sexuality, [facility]  Sexual Health  Department of Family Medicine and Community Health  [facility]

## 2024-10-08 ENCOUNTER — OFFICE VISIT (OUTPATIENT)
Dept: PSYCHOLOGY | Facility: CLINIC | Age: 34
End: 2024-10-08
Payer: COMMERCIAL

## 2024-10-08 DIAGNOSIS — F41.9 ANXIETY: Primary | ICD-10-CM

## 2024-10-08 PROCEDURE — 90837 PSYTX W PT 60 MINUTES: CPT | Mod: HN

## 2024-10-08 NOTE — PROGRESS NOTES
[facility] - Progress Note    Date of Service: 10/08/24   Name: Shar Diaz  : 1990  Medical Record Number: [id number]  Treating Provider: Yolie Panchal, PhD  Type of Session: Family with client present  Present in Session: Client and , Ed  Session Start and Stop Time: 4:00-4:55pm  Number of Minutes:  55 min    SERVICE MODALITY:  In-person    DSM-5 Diagnoses:  Anxiety    Current Reported Symptoms and Status update:  Shar and Ed experience discrepancies in their levels of sexual desire. Shar is the higher desire partner and struggles with anxiety and worry related with not feeling desired, affecting is sexual and body esteem. Ed is the lower desiring partner and fears that Shar might leave him because he does not want to engage in sex as frequently. Ed expressed to require novelty to experience interest and to have kinky desires. The couple has loving and affectionate relationship, but are currently dealing with intimacy difficulties as Ed avoids sex. Both express difficulties and embarrassment related with communicating about sex. The couple has an open relationship, but with the exception of a neighbor couple that they have sex with separately, Shar seems to be only one engaging in sex outside of the relationship.     Areas of treatment focus:  1) Rebuild non-sexual intimacy, including touch.  2) Identify and express sexual desires, including kinks and fantasies.  3) Improve sexual communication.  4) Promote sexual novelty and sexual play.    Changes since last session: Couple took hiatus on sensate focus and thinks of taking sex completely out of the equation for 2 weeks to reduce performance anxiety and make quality time more enjoyable and non-demanding. Couple reports to have had sexual interactions that felt pleasurable. Ed worries that Shar is disappointed and compromising too much.    Progress Toward Treatment Goals:   Satisfactory  "progress.    Therapeutic Interventions/Treatment Strategies:    Area(s) of treatment focus addressed in this session included Interpersonal Relationship Skills and Sexual Health and Wellness.  Unpacked feelings regarding the compromises made in the context of the relationship.  Promoted clear communication of expectations and shared goals. Highlighted that couples' therapy assumes both people contribute to the dynamic and that no one is \"broken\". Couple reacted well to interventions and were able to to identify shared goal and strategies to move forward.    Patient Response:   Patient responded to session by accepting feedback, giving feedback, accepting support, and actively engaged  Possible barriers to participation / learning include: N/A    Current Mental Status Exam:   Appearance:  Appropriate   Eye Contact:  Good   Attitude / Demeanor: Cooperative   Speech      Rate / Production: Normal/ Responsive      Volume:  Normal  volume  Orientation:  All  Mood:   Normal  Affect:   Appropriate   Thought Content: Clear   Insight:   Good       Plan/Need for Future Services:  Return for therapy in 2 weeks to treat diagnosed problems.    Patient has a current master individualized treatment plan.  See Epic treatment plan for more information.    Referral / Collaboration:  Referral to another professional/service is not indicated at this time.  Emergency Services Needed?  No    Assignment:  NEW: Reflect together on how to help Ed removing 1-10% of his concerns about their sexual relationship (which occupy 0% of his headspace).  NEW: Take Kink Orientation Scale survey  Ongoing: Invest quality time during meal time and prep.  Ongoing: Going to get massages at the mall and talk while walk around after going out (reconnecting ritual)   Ongoing: Being curious about their partner's sexual interests.    Interactive Complexity:  There are four specific communication difficulties that complicate the work of the primary " psychiatric procedure.  Interactive complexity (+86827) may be reported when at least one of these difficulties is present.    Communication difficulties present during current the psychiatric procedure include:  None.        Yolie Vaughn, PhD         I did not personally see the patient. I reviewed and agree with the assessment and plan of this note.       Merced Lynn, PhD, LP    Program in Human Sexuality, [facility]  Department of Family Medicine and Community Health  [facility]

## 2024-10-22 ENCOUNTER — OFFICE VISIT (OUTPATIENT)
Dept: PSYCHOLOGY | Facility: CLINIC | Age: 34
End: 2024-10-22
Payer: COMMERCIAL

## 2024-10-22 DIAGNOSIS — F41.9 ANXIETY: Primary | ICD-10-CM

## 2024-10-22 PROCEDURE — 90847 FAMILY PSYTX W/PT 50 MIN: CPT | Mod: HN

## 2024-10-22 NOTE — PROGRESS NOTES
[facility] - Progress Note    Date of Service: 10/22/24   Name: Shar Diaz  : 1990  Medical Record Number: [id number]  Treating Provider: Yolie Panchal, PhD and Cleo Longoria med student  Type of Session: Family with client present  Present in Session: Client and , Ed  Session Start and Stop Time: 4:00-4:55pm  Number of Minutes:  55 min    SERVICE MODALITY:  In-person    DSM-5 Diagnoses:  Anxiety    Current Reported Symptoms and Status update:  Shar and Ed experience discrepancies in their levels of sexual desire. Shar is the higher desire partner and struggles with anxiety and worry related with not feeling desired, affecting is sexual and body esteem. Ed is the lower desiring partner and fears that Shar might leave him because he does not want to engage in sex as frequently. Ed expressed to require novelty to experience interest and to have kinky desires. The couple has loving and affectionate relationship, but are currently dealing with intimacy difficulties as Ed avoids sex. Both express difficulties and embarrassment related with communicating about sex. The couple has an open relationship, but with the exception of a neighbor couple that they have sex with separately, Shar seems to be only one engaging in sex outside of the relationship.     Areas of treatment focus:  1) Rebuild non-sexual intimacy, including touch.  2) Identify and express sexual desires, including kinks and fantasies.  3) Improve sexual communication.  4) Promote sexual novelty and sexual play.    Changes since last session: No changes. Couple will be going in cruise ship in a couple of days.     Progress Toward Treatment Goals:   Satisfactory progress.    Therapeutic Interventions/Treatment Strategies:    Area(s) of treatment focus addressed in this session included Interpersonal Relationship Skills and Sexual Health and Wellness.  Promoted clear communication of expectations  and ground rules on the cruise. Explored openness to novel or kinky activities, and strategies to facilitate new sexual practices. Ed voiced that would like more quality time with Shar. Shar agreed to make time the next day and that would further discuss expectations for upcoming trip.    Patient Response:   Patient responded to session by accepting feedback, giving feedback, accepting support, and actively engaged  Possible barriers to participation / learning include: N/A    Current Mental Status Exam:   Appearance:  Appropriate   Eye Contact:  Good   Attitude / Demeanor: Cooperative   Speech      Rate / Production: Normal/ Responsive      Volume:  Normal  volume  Orientation:  All  Mood:   Normal  Affect:   Appropriate   Thought Content: Clear   Insight:   Good       Plan/Need for Future Services:  Return for therapy in 2 weeks to treat diagnosed problems.    Patient has a current master individualized treatment plan.  See Epic treatment plan for more information.    Referral / Collaboration:  Referral to another professional/service is not indicated at this time.  Emergency Services Needed?  No    Assignment:  NEW: Reflect together on how to help Ed removing 1-10% of his concerns about their sexual relationship (which occupy 0% of his headspace).  NEW: Take Kink Orientation Scale survey  Ongoing: Invest quality time during meal time and prep.  Ongoing: Going to get massages at the mall and talk while walk around after going out (reconnecting ritual)   Ongoing: Being curious about their partner's sexual interests.    Interactive Complexity:  There are four specific communication difficulties that complicate the work of the primary psychiatric procedure.  Interactive complexity (+87903) may be reported when at least one of these difficulties is present.    Communication difficulties present during current the psychiatric procedure include:  None.        Yolie Vaughn, PhD         I did not personally  see the patient. I reviewed and agree with the assessment and plan of this note.       Merced Lynn, PhD, LP    [facility]  Department of Family Medicine and Community Health  [facility]

## 2024-11-05 ENCOUNTER — LAB (OUTPATIENT)
Dept: LAB | Facility: CLINIC | Age: 34
End: 2024-11-05
Payer: COMMERCIAL

## 2024-11-05 DIAGNOSIS — Z20.2 EXPOSURE TO STD: ICD-10-CM

## 2024-11-05 DIAGNOSIS — Z11.3 SCREENING EXAMINATION FOR VENEREAL DISEASE: ICD-10-CM

## 2024-11-05 DIAGNOSIS — Z20.5 EXPOSURE TO HEPATITIS C: ICD-10-CM

## 2024-11-05 DIAGNOSIS — Z11.3 SCREEN FOR STD (SEXUALLY TRANSMITTED DISEASE): ICD-10-CM

## 2024-11-05 DIAGNOSIS — Z11.3 ROUTINE SCREENING FOR STI (SEXUALLY TRANSMITTED INFECTION): ICD-10-CM

## 2024-11-05 DIAGNOSIS — Z29.81 ENCOUNTER FOR HIV PRE-EXPOSURE PROPHYLAXIS: ICD-10-CM

## 2024-11-05 PROCEDURE — 86803 HEPATITIS C AB TEST: CPT

## 2024-11-05 PROCEDURE — 87536 HIV-1 QUANT&REVRSE TRNSCRPJ: CPT

## 2024-11-05 PROCEDURE — 87389 HIV-1 AG W/HIV-1&-2 AB AG IA: CPT

## 2024-11-05 PROCEDURE — 87591 N.GONORRHOEAE DNA AMP PROB: CPT

## 2024-11-05 PROCEDURE — 87491 CHLMYD TRACH DNA AMP PROBE: CPT

## 2024-11-05 PROCEDURE — 86780 TREPONEMA PALLIDUM: CPT

## 2024-11-05 PROCEDURE — 87491 CHLMYD TRACH DNA AMP PROBE: CPT | Mod: 59

## 2024-11-05 PROCEDURE — 36415 COLL VENOUS BLD VENIPUNCTURE: CPT

## 2024-11-06 ENCOUNTER — MYC MEDICAL ADVICE (OUTPATIENT)
Dept: FAMILY MEDICINE | Facility: CLINIC | Age: 34
End: 2024-11-06

## 2024-11-06 ENCOUNTER — ALLIED HEALTH/NURSE VISIT (OUTPATIENT)
Dept: FAMILY MEDICINE | Facility: CLINIC | Age: 34
End: 2024-11-06
Payer: COMMERCIAL

## 2024-11-06 DIAGNOSIS — Z20.2 SEXUALLY TRANSMITTED DISEASE EXPOSURE: Primary | ICD-10-CM

## 2024-11-06 DIAGNOSIS — Z11.3 SCREENING EXAMINATION FOR VENEREAL DISEASE: Primary | ICD-10-CM

## 2024-11-06 LAB
C TRACH DNA SPEC QL NAA+PROBE: NEGATIVE
C TRACH DNA SPEC QL PROBE+SIG AMP: NEGATIVE
C TRACH DNA SPEC QL PROBE+SIG AMP: NEGATIVE
HCV AB SERPL QL IA: NONREACTIVE
HIV 1+2 AB+HIV1 P24 AG SERPL QL IA: NONREACTIVE
N GONORRHOEA DNA SPEC QL NAA+PROBE: NEGATIVE
T PALLIDUM AB SER QL: NONREACTIVE

## 2024-11-06 PROCEDURE — 99207 PR NO CHARGE NURSE ONLY: CPT

## 2024-11-06 PROCEDURE — 96372 THER/PROPH/DIAG INJ SC/IM: CPT | Performed by: INTERNAL MEDICINE

## 2024-11-06 ASSESSMENT — ASTHMA QUESTIONNAIRES
QUESTION_1 LAST FOUR WEEKS HOW MUCH OF THE TIME DID YOUR ASTHMA KEEP YOU FROM GETTING AS MUCH DONE AT WORK, SCHOOL OR AT HOME: NONE OF THE TIME
QUESTION_4 LAST FOUR WEEKS HOW OFTEN HAVE YOU USED YOUR RESCUE INHALER OR NEBULIZER MEDICATION (SUCH AS ALBUTEROL): NOT AT ALL
ACT_TOTALSCORE: 22
QUESTION_2 LAST FOUR WEEKS HOW OFTEN HAVE YOU HAD SHORTNESS OF BREATH: ONCE OR TWICE A WEEK
ACT_TOTALSCORE: 22
QUESTION_5 LAST FOUR WEEKS HOW WOULD YOU RATE YOUR ASTHMA CONTROL: WELL CONTROLLED
QUESTION_3 LAST FOUR WEEKS HOW OFTEN DID YOUR ASTHMA SYMPTOMS (WHEEZING, COUGHING, SHORTNESS OF BREATH, CHEST TIGHTNESS OR PAIN) WAKE YOU UP AT NIGHT OR EARLIER THAN USUAL IN THE MORNING: ONCE OR TWICE

## 2024-11-06 NOTE — PROGRESS NOTES
"Clinic Administered Medication Documentation    New concerns today?: No    Ordering Provider: Dr. Yasir Mehta  Labs (HIV ag/ab plus HIV RNA) completed less than 7 days prior to today's injection? Yes  Last injection date: 24    Has the prior authorization been completed? Yes    Is there an active order (written within the past 365 days, with administrations remaining, not ) in the chart? Yes    Side effects with previous injections? No    Is patient taking any of the following medications?: No  Carbamazepine, oxcarbazepine, phenobarbital, phenytoin, rifabutin, rifampin, rifapentine.      Estimated body mass index is 23.05 kg/m  as calculated from the following:    Height as of 3/25/24: 1.748 m (5' 8.8\").    Weight as of 3/25/24: 70.4 kg (155 lb 3.2 oz).  Use 2 inch needle if BMI >30kg/m2  Shake the vial vigorously so that the suspension looks uniform before injection. Small air bubbles are expected and acceptable.  Draw up in syringe and administer in the ventrogluteal site.  The dorsogluteal (upper outer quadrant) is an acceptable alternative.  For IM administration only.    Administered Apretude (cabotegravir) to the Dorsogluteal site.  Administered on the right.    Vial/Syringe: Single dose vial. Was entire vial of medication used? Yes    Next labs: 8 weeks +/- 7 days  Next injection: 8 weeks +/- 7 days  Appointments scheduled: Yes: 25   "

## 2024-11-07 LAB — HIV1 RNA # PLAS NAA DL=20: NOT DETECTED COPIES/ML

## 2024-11-11 ENCOUNTER — MYC REFILL (OUTPATIENT)
Dept: FAMILY MEDICINE | Facility: CLINIC | Age: 34
End: 2024-11-11
Payer: COMMERCIAL

## 2024-11-11 DIAGNOSIS — J45.20 MILD INTERMITTENT ASTHMA WITHOUT COMPLICATION: ICD-10-CM

## 2024-11-12 ENCOUNTER — OFFICE VISIT (OUTPATIENT)
Dept: PSYCHOLOGY | Facility: CLINIC | Age: 34
End: 2024-11-12
Payer: COMMERCIAL

## 2024-11-12 DIAGNOSIS — F41.9 ANXIETY: Primary | ICD-10-CM

## 2024-11-13 RX ORDER — PREDNISONE 20 MG/1
40 TABLET ORAL DAILY
Qty: 10 TABLET | Refills: 3 | Status: SHIPPED | OUTPATIENT
Start: 2024-11-13

## 2024-11-13 NOTE — PROGRESS NOTES
"[facility] - Progress [facility]    Date of Service: 24   Name: Shar Diaz  : 1990  Medical Record Number: [id number]  Treating Provider: Yolie Panchal, PhD  Type of Session: Family with client present  Present in Session: Client and , Ed  Session Start and Stop Time: 4:00-4:55pm  Number of Minutes:  55 min    SERVICE MODALITY:  In-person    DSM-5 Diagnoses:  Anxiety    Current Reported Symptoms and Status update:  Shar and Ed experience discrepancies in their levels of sexual desire. Shar is the higher desire partner and struggles with anxiety and worry related with not feeling desired, affecting is sexual and body esteem. Ed is the lower desiring partner and fears that Shar might leave him because he does not want to engage in sex as frequently. Ed expressed to require novelty to experience interest and to have kinky desires. The couple has loving and affectionate relationship, but are currently dealing with intimacy difficulties as Ed avoids sex. Both express difficulties and embarrassment related with communicating about sex. The couple has an open relationship, but with the exception of a neighbor couple that they have sex with separately, Shar seems to be only one engaging in sex outside of the relationship.     Areas of treatment focus:  1) Rebuild non-sexual intimacy, including touch.  2) Identify and express sexual desires, including kinks and fantasies.  3) Improve sexual communication.  4) Promote sexual novelty and sexual play.    Changes since last session: Couple enjoyed their time in the cruise and have been more sexual to each other than before. Ed learned that he might be a \"side\", which made him that belonged somewhere.      Progress Toward Treatment Goals:   Satisfactory progress.    Therapeutic Interventions/Treatment Strategies:    Area(s) of treatment focus addressed in this session included Interpersonal Relationship Skills and " "Sexual Health and Wellness.  Reviewed experiences in the cruise that felt positive and further promoted the atitudes and behaviors that conducted to these. Couple identified shared quality time as well as novelty were key to their enjoyment. Couple discussed ways to keep their \"momentum\" going in the cities. Couple is excited with new sexual possibilities together and with other people.     Patient Response:   Patient responded to session by accepting feedback, giving feedback, accepting support, and actively engaged  Possible barriers to participation / learning include: N/A    Current Mental Status Exam:   Appearance:  Appropriate   Eye Contact:  Good   Attitude / Demeanor: Cooperative   Speech      Rate / Production: Normal/ Responsive      Volume:  Normal  volume  Orientation:  All  Mood:   Normal  Affect:   Appropriate   Thought Content: Clear   Insight:   Good       Plan/Need for Future Services:  Return for therapy in 2 weeks to treat diagnosed problems.    Patient has a current master individualized treatment plan.  See Epic treatment plan for more information.    Referral / Collaboration:  Referral to another professional/service is not indicated at this time.  Emergency Services Needed?  No    Assignment:  NEW: Reflect together on how to help Ed removing 1-10% of his concerns about their sexual relationship (which occupy 0% of his headspace).  NEW: Take Kink Orientation Scale survey  Ongoing: Invest quality time during meal time and prep.  Ongoing: Going to get massages at the mall and talk while walk around after going out (reconnecting ritual)   Ongoing: Being curious about their partner's sexual interests.    Interactive Complexity:  There are four specific communication difficulties that complicate the work of the primary psychiatric procedure.  Interactive complexity (+74631) may be reported when at least one of these difficulties is present.    Communication difficulties present during current the " psychiatric procedure include:  None.        Yolie Vaughn, PhD

## 2024-11-13 NOTE — PROGRESS NOTES
I did not personally see the patient but I have reviewed and agree with the assessment and plan as documented in this note.  Rima Galaviz, PhD -- Supervisor   Licensed Psychologist

## 2024-11-19 ENCOUNTER — LAB (OUTPATIENT)
Dept: LAB | Facility: CLINIC | Age: 34
End: 2024-11-19
Payer: COMMERCIAL

## 2024-11-19 DIAGNOSIS — J02.9 SORE THROAT: ICD-10-CM

## 2024-11-19 DIAGNOSIS — J02.9 SORE THROAT: Primary | ICD-10-CM

## 2024-11-19 LAB
DEPRECATED S PYO AG THROAT QL EIA: NEGATIVE
GROUP A STREP BY PCR: NOT DETECTED
MONOCYTES NFR BLD AUTO: NEGATIVE %

## 2024-11-19 PROCEDURE — 36415 COLL VENOUS BLD VENIPUNCTURE: CPT

## 2024-11-19 PROCEDURE — 87651 STREP A DNA AMP PROBE: CPT

## 2024-11-19 PROCEDURE — 86308 HETEROPHILE ANTIBODY SCREEN: CPT

## 2024-11-26 ENCOUNTER — OFFICE VISIT (OUTPATIENT)
Dept: PSYCHOLOGY | Facility: CLINIC | Age: 34
End: 2024-11-26
Payer: COMMERCIAL

## 2024-11-26 DIAGNOSIS — F41.9 ANXIETY: Primary | ICD-10-CM

## 2024-11-27 NOTE — PROGRESS NOTES
[facility] - Progress Note    Date of Service: 24   Name: Shar Diaz  : 1990  Medical Record Number: [id number]  Treating Provider: Yolie Panchal, PhD  Type of Session: Family with client present  Present in Session: Client and , Ed  Session Start and Stop Time: 4:00-4:55pm  Number of Minutes:  55 min    SERVICE MODALITY:  In-person    DSM-5 Diagnoses:  Anxiety    Current Reported Symptoms and Status update:  Shar and Ed experience discrepancies in their levels of sexual desire. Shar is the higher desire partner and struggles with anxiety and worry related with not feeling desired, affecting is sexual and body esteem. Ed is the lower desiring partner and fears that Shar might leave him because he does not want to engage in sex as frequently. Ed expressed to require novelty to experience interest and to have kinky desires. The couple has loving and affectionate relationship, but are currently dealing with intimacy difficulties as Ed avoids sex. Both express difficulties and embarrassment related with communicating about sex. The couple has an open relationship, but with the exception of a neighbor couple that they have sex with separately, Shar seems to be only one engaging in sex outside of the relationship.     Areas of treatment focus:  1) Rebuild non-sexual intimacy, including touch.  2) Identify and express sexual desires, including kinks and fantasies.  3) Improve sexual communication.  4) Promote sexual novelty and sexual play.    Changes since last session: Couple has been sexually intimate and enjoying quality time together, as well as enjoying extra-dyadic liaisons. Ed decided that will not be joining Shar's family for Thanksgiving due to political differences. Couple met with third and had another experience where Ed bottomed (for the first time).    Progress Toward Treatment Goals:   Satisfactory progress.    Therapeutic  "Interventions/Treatment Strategies:    Area(s) of treatment focus addressed in this session included Interpersonal Relationship Skills and Sexual Health and Wellness.  Couple expressed being supportive of each others decisions regarding Thanksgiving. Shar become emotional when talking about his estranged mother, with who he cut ties when she did not accept Ed. Shar is thankful that despite their political differences, that his father's family wants him around, but understands Ed's distress. Ed became emotional when reflecting about his recent sexual achievements and how he is feeling free to be vulnerable with Shar about his preferences without fearing to loose him. Both reported to be content and that did not expect to become emotional during session.    Patient Response:   Patient responded to session by accepting feedback, giving feedback, accepting support, and actively engaged. Both Shar and Ed were emotional and cried during session and were physically and emotionally comforting to each other.  Possible barriers to participation / learning include: N/A    Current Mental Status Exam:   Appearance:  Appropriate   Eye Contact:  Good   Attitude / Demeanor: Cooperative   Speech      Rate / Production: Normal/ Responsive      Volume:  Normal  volume  Orientation:  All  Mood:   Normal, sad  Affect:   Appropriate   Thought Content: Clear   Insight:   Good       Plan/Need for Future Services:  Return for therapy in 2 weeks to treat diagnosed problems.    Patient has a current master individualized treatment plan.  See Epic treatment plan for more information.    Referral / Collaboration:  Referral to another professional/service is not indicated at this time.  Emergency Services Needed?  No    Assignment:  NEW: 4-way conversation with the \"neighbors boys\" to renegotiate dynamic.  Ongoing: Invest quality time during meal time and prep.  Ongoing: Going to get massages at the mall and talk while walk around " after going out (reconnecting ritual)   Past: sensate focus, kink orientation scale.    Interactive Complexity:  There are four specific communication difficulties that complicate the work of the primary psychiatric procedure.  Interactive complexity (+87287) may be reported when at least one of these difficulties is present.    Communication difficulties present during current the psychiatric procedure include:  None.        Yolie Vaughn, PhD

## 2024-12-09 ENCOUNTER — MYC REFILL (OUTPATIENT)
Dept: FAMILY MEDICINE | Facility: CLINIC | Age: 34
End: 2024-12-09
Payer: COMMERCIAL

## 2024-12-09 DIAGNOSIS — J45.20 MILD INTERMITTENT ASTHMA WITHOUT COMPLICATION: ICD-10-CM

## 2024-12-09 RX ORDER — PREDNISONE 20 MG/1
40 TABLET ORAL DAILY
Qty: 10 TABLET | Refills: 3 | Status: SHIPPED | OUTPATIENT
Start: 2024-12-09

## 2024-12-10 ENCOUNTER — OFFICE VISIT (OUTPATIENT)
Dept: PSYCHOLOGY | Facility: CLINIC | Age: 34
End: 2024-12-10
Payer: COMMERCIAL

## 2024-12-10 DIAGNOSIS — F41.9 ANXIETY: Primary | ICD-10-CM

## 2024-12-10 NOTE — PROGRESS NOTES
[facility] - Progress Note    Date of Service: 12/10/24   Name: Shar Diaz  : 1990  Medical Record Number: [id number]  Treating Provider: Yolie Panchal, PhD  Type of Session: Family with client present  Present in Session: Client and , Ed  Session Start and Stop Time: 4:00-4:55pm  Number of Minutes:  55 min    SERVICE MODALITY:  In-person    DSM-5 Diagnoses:  Anxiety    Current Reported Symptoms and Status update:  Shar and Ed experience discrepancies in their levels of sexual desire. Shar is the higher desire partner and struggles with anxiety and worry related with not feeling desired, affecting is sexual and body esteem. Ed is the lower desiring partner and fears that Shar might leave him because he does not want to engage in sex as frequently. Ed expressed to require novelty to experience interest and to have kinky desires. The couple has loving and affectionate relationship, but are currently dealing with intimacy difficulties as Ed avoids sex. Both express difficulties and embarrassment related with communicating about sex. The couple has an open relationship, but with the exception of a neighbor couple that they have sex with separately, Shar seems to be only one engaging in sex outside of the relationship.     Areas of treatment focus:  1) Rebuild non-sexual intimacy, including touch.  2) Identify and express sexual desires, including kinks and fantasies.  3) Improve sexual communication.  4) Promote sexual novelty and sexual play.    Changes since last session: Ed reported to be feeling under appreciated at home and to be disappointed that Shar chose to spend his birthday at a conference that ended up attending virtually. Shar has been feeling unattractive.    Progress Toward Treatment Goals:   Satisfactory progress.    Therapeutic Interventions/Treatment Strategies:    Area(s) of treatment focus addressed in this session included  "Interpersonal Relationship Skills and Sexual Health and Wellness.  Reviewed attitudes and behaviors affecting couple at a surface level and identified underlying themes of \"(un)attractiveness\" and \"(under)appreciation\". Ed expressed negative emotions about the recent events and stated that Lulauren \"should have known\" what he needed \"as an empathetic person would have\". Clinician noted that Ed seemed unaware of his feelings until very recently and that \"mind reading\" is not to be expected in any relationship.     Patient Response:   Patient responded to session by accepting feedback, giving feedback, accepting support, and actively engaged. Ed presented agitated and frustrated during session, while Shar seemed contained.  Possible barriers to participation / learning include: N/A    Current Mental Status Exam:   Appearance:  Appropriate   Eye Contact:  Good   Attitude / Demeanor: Cooperative   Speech      Rate / Production: Normal/ Responsive      Volume:  Normal  volume  Orientation:  All  Mood:   Normal, sad  Affect:   Appropriate   Thought Content: Clear   Insight:   Good       Plan/Need for Future Services:  Return for therapy in 2 weeks to treat diagnosed problems.    Patient has a current master individualized treatment plan.  See Epic treatment plan for more information.    Referral / Collaboration:  Referral to another professional/service is not indicated at this time.  Emergency Services Needed?  No    Assignment:  NEW: 4-way conversation with the \"neighbors boys\" to renegotiate dynamic.  Ongoing: Invest quality time during meal time and prep.  Ongoing: Going to get massages at the mall and talk while walk around after going out (reconnecting ritual)   Past: sensate focus, kink orientation scale.    Interactive Complexity:  There are four specific communication difficulties that complicate the work of the primary psychiatric procedure.  Interactive complexity (+96005) may be reported when at least one " of these difficulties is present.    Communication difficulties present during current the psychiatric procedure include:  None.        Yolie Vaughn, PhD         I did not personally see the patient. I reviewed and agree with the assessment and plan of this note.       Merced Lynn, PhD, LP    Program in Human Sexuality[facility]  Department of Family Medicine and [facility]

## 2025-01-04 ENCOUNTER — LAB (OUTPATIENT)
Dept: LAB | Facility: CLINIC | Age: 35
End: 2025-01-04
Payer: COMMERCIAL

## 2025-01-04 DIAGNOSIS — Z20.2 EXPOSURE TO STD: ICD-10-CM

## 2025-01-04 DIAGNOSIS — Z11.3 SCREENING EXAMINATION FOR VENEREAL DISEASE: ICD-10-CM

## 2025-01-04 PROCEDURE — 87536 HIV-1 QUANT&REVRSE TRNSCRPJ: CPT

## 2025-01-04 PROCEDURE — 86780 TREPONEMA PALLIDUM: CPT

## 2025-01-04 PROCEDURE — 36415 COLL VENOUS BLD VENIPUNCTURE: CPT

## 2025-01-04 PROCEDURE — 87389 HIV-1 AG W/HIV-1&-2 AB AG IA: CPT

## 2025-01-04 PROCEDURE — 87591 N.GONORRHOEAE DNA AMP PROB: CPT

## 2025-01-04 PROCEDURE — 87491 CHLMYD TRACH DNA AMP PROBE: CPT

## 2025-01-05 LAB — HIV 1+2 AB+HIV1 P24 AG SERPL QL IA: NONREACTIVE

## 2025-01-06 LAB
C TRACH DNA SPEC QL PROBE+SIG AMP: NEGATIVE
N GONORRHOEA DNA SPEC QL NAA+PROBE: NEGATIVE
T PALLIDUM AB SER QL: NONREACTIVE

## 2025-01-07 LAB — HIV1 RNA # PLAS NAA DL=20: NOT DETECTED COPIES/ML

## 2025-01-08 ENCOUNTER — ALLIED HEALTH/NURSE VISIT (OUTPATIENT)
Dept: FAMILY MEDICINE | Facility: CLINIC | Age: 35
End: 2025-01-08
Payer: COMMERCIAL

## 2025-01-08 DIAGNOSIS — Z29.81 ENCOUNTER FOR HIV PRE-EXPOSURE PROPHYLAXIS: Primary | ICD-10-CM

## 2025-01-08 PROCEDURE — 96372 THER/PROPH/DIAG INJ SC/IM: CPT | Performed by: INTERNAL MEDICINE

## 2025-01-08 PROCEDURE — 99207 PR NO CHARGE NURSE ONLY: CPT

## 2025-01-08 NOTE — PROGRESS NOTES
"Clinic Administered Medication Documentation    New concerns today?: No    Ordering Provider: Dr. Yasir Mehta  Labs (HIV ag/ab plus HIV RNA) completed less than 7 days prior to today's injection? Yes  Last injection date: 24    Has the prior authorization been completed? Yes    Is there an active order (written within the past 365 days, with administrations remaining, not ) in the chart? Yes    Side effects with previous injections? No    Is patient taking any of the following medications?: No  Carbamazepine, oxcarbazepine, phenobarbital, phenytoin, rifabutin, rifampin, rifapentine.      Estimated body mass index is 23.05 kg/m  as calculated from the following:    Height as of 3/25/24: 1.748 m (5' 8.8\").    Weight as of 3/25/24: 70.4 kg (155 lb 3.2 oz).  Use 2 inch needle if BMI >30kg/m2  Shake the vial vigorously so that the suspension looks uniform before injection. Small air bubbles are expected and acceptable.  Draw up in syringe and administer in the ventrogluteal site.  The dorsogluteal (upper outer quadrant) is an acceptable alternative.  For IM administration only.    Administered Apretude (cabotegravir) to the Dorsogluteal site.  Administered on the right.    Vial/Syringe: Single dose vial. Was entire vial of medication used? Yes    Next labs: 8 weeks +/- 7 days  Next injection: 8 weeks +/- 7 days  Appointments scheduled: Yes: 3/10/25   "

## 2025-01-14 ENCOUNTER — OFFICE VISIT (OUTPATIENT)
Dept: PSYCHOLOGY | Facility: CLINIC | Age: 35
End: 2025-01-14
Payer: COMMERCIAL

## 2025-01-14 DIAGNOSIS — F41.1 GAD (GENERALIZED ANXIETY DISORDER): Primary | ICD-10-CM

## 2025-01-14 NOTE — PROGRESS NOTES
[facility] - Progress Note    Date of Service: 25   Name: Shar Diaz  : 1990  Medical Record Number: [id number]  Treating Provider: Yolie Panchal, PhD  Type of Session: Family with client present  Present in Session: Client and , Ed  Session Start and Stop Time: 4:00-4:55pm  Number of Minutes:  55 min    SERVICE MODALITY:  In-person    DSM-5 Diagnoses:  Anxiety    Current Reported Symptoms and Status update:  Shar and Ed experience discrepancies in their levels of sexual desire. Shar is the higher desire partner and struggles with anxiety and worry related with not feeling desired, affecting is sexual and body esteem. Ed is the lower desiring partner and fears that Shar might leave him because he does not want to engage in sex as frequently. Ed expressed to require novelty to experience interest and to have kinky desires. The couple has loving and affectionate relationship, but are currently dealing with intimacy difficulties as Ed avoids sex. Both express difficulties and embarrassment related with communicating about sex. The couple has an open relationship, but with the exception of a neighbor couple that they have sex with separately, Shar seems to be only one engaging in sex outside of the relationship.     Areas of treatment focus:  1) Rebuild non-sexual intimacy, including touch.  2) Identify and express sexual desires, including kinks and fantasies.  3) Improve sexual communication.  4) Promote sexual novelty and sexual play.    Changes since last session: Couple had disagreement over Lily, where Ed felt under appreciated. Shar is working on showing more signs of appreciating Ed. Ed sees this and appreciates. Shar reported feeling unattractive and that Ed seemed to be engaging in touch and cuddling just to please him.     Progress Toward Treatment Goals:   Satisfactory progress.    Therapeutic Interventions/Treatment  "Strategies:    Area(s) of treatment focus addressed in this session included Interpersonal Relationship Skills and Sexual Health and Wellness.  Continued to work on the themes of \"(un)attractiveness\" and \"(under)appreciation\". Reframed effort and intention with positive lens. Explored ways to promote a higher sense of appreciation and wantedness in the relationship. Ed shared some discomfort with \"don't ask, don't tell\" rule and wants to continue talking about it. Ed also voiced missing to spend more time with Shar.    Patient Response:   Patient responded to session by accepting feedback, giving feedback, accepting support, and actively engaged. Possible barriers to participation / learning include: N/A    Current Mental Status Exam:   Appearance:  Appropriate   Eye Contact:  Good   Attitude / Demeanor: Cooperative   Speech      Rate / Production: Normal/ Responsive      Volume:  Normal  volume  Orientation:  All  Mood:   Normal, sad  Affect:   Appropriate   Thought Content: Clear   Insight:   Good       Plan/Need for Future Services:  Return for therapy in 2 weeks to treat diagnosed problems.    Patient has a current master individualized treatment plan.  See Epic treatment plan for more information.    Referral / Collaboration:  Referral to another professional/service is not indicated at this time.  Emergency Services Needed?  No    Assignment:  Ongoing: Invest quality time during meal time and prep.  Ongoing: Going to get massages at the mall and talk while walk around after going out (reconnecting ritual)   Continued: sensate focus  Past: kink orientation scale    Interactive Complexity:  There are four specific communication difficulties that complicate the work of the primary psychiatric procedure.  Interactive complexity (+[id number]) may be reported when at least one of these difficulties is present.    Communication difficulties present during current the psychiatric procedure " include:  None.        Yolie Vaughn, PhD         I did not personally see the patient. I reviewed and agree with the assessment and plan of this note.       Merced Lynn, PhD, LP    Program in Human Sexuality, [facility]  Department of Family Medicine and Community Health  [facility]

## 2025-02-02 ENCOUNTER — LAB (OUTPATIENT)
Dept: LAB | Facility: CLINIC | Age: 35
End: 2025-02-02
Payer: COMMERCIAL

## 2025-02-02 DIAGNOSIS — Z11.3 SCREENING EXAMINATION FOR VENEREAL DISEASE: ICD-10-CM

## 2025-02-02 PROCEDURE — 36415 COLL VENOUS BLD VENIPUNCTURE: CPT

## 2025-02-02 PROCEDURE — 86780 TREPONEMA PALLIDUM: CPT

## 2025-02-02 PROCEDURE — 87536 HIV-1 QUANT&REVRSE TRNSCRPJ: CPT | Mod: 59

## 2025-02-02 PROCEDURE — 87389 HIV-1 AG W/HIV-1&-2 AB AG IA: CPT

## 2025-02-02 PROCEDURE — 87491 CHLMYD TRACH DNA AMP PROBE: CPT | Mod: 91

## 2025-02-02 PROCEDURE — 87591 N.GONORRHOEAE DNA AMP PROB: CPT | Mod: 59

## 2025-02-03 LAB
HIV 1+2 AB+HIV1 P24 AG SERPL QL IA: NONREACTIVE
T PALLIDUM AB SER QL: NONREACTIVE

## 2025-02-04 LAB
C TRACH DNA SPEC QL PROBE+SIG AMP: NEGATIVE
HIV1 RNA # PLAS NAA DL=20: NOT DETECTED COPIES/ML
N GONORRHOEA DNA SPEC QL NAA+PROBE: NEGATIVE
SPECIMEN TYPE: NORMAL

## 2025-02-11 ENCOUNTER — OFFICE VISIT (OUTPATIENT)
Dept: PSYCHOLOGY | Facility: CLINIC | Age: 35
End: 2025-02-11
Payer: COMMERCIAL

## 2025-02-11 ENCOUNTER — TELEPHONE (OUTPATIENT)
Dept: FAMILY MEDICINE | Facility: CLINIC | Age: 35
End: 2025-02-11
Payer: COMMERCIAL

## 2025-02-11 DIAGNOSIS — Z20.2 SEXUALLY TRANSMITTED DISEASE EXPOSURE: ICD-10-CM

## 2025-02-11 DIAGNOSIS — F41.1 GAD (GENERALIZED ANXIETY DISORDER): Primary | ICD-10-CM

## 2025-02-11 DIAGNOSIS — Z29.81 ENCOUNTER FOR HIV PRE-EXPOSURE PROPHYLAXIS: ICD-10-CM

## 2025-02-11 PROCEDURE — 90847 FAMILY PSYTX W/PT 50 MIN: CPT | Mod: HN

## 2025-02-11 NOTE — TELEPHONE ENCOUNTER
----- Message from Osito WOODALL sent at 2/11/2025  4:12 PM CST -----    ----- Message -----  From: Trudy Archuleta  Sent: 2/11/2025   2:58 PM CST  To: [facility]    [facility],    This patient is scheduled for Apretude on 3.10.25.  Their med order expires on 3.7.25. Please enter a new CAM order.     Thanks!  Trudy Archuleta     [facility]  HE Clinically Administered Medications

## 2025-02-11 NOTE — PROGRESS NOTES
[facility] - Progress Note    Date of Service: 25   Name: Shar Diaz  : 1990  Medical Record Number: [id number]  Treating Provider: Yolie Panchal, PhD  Type of Session: Family with client present  Present in Session: Client and , Ed  Session Start and Stop Time: 4:00-4:55pm  Number of Minutes:  55 min    SERVICE MODALITY:  In-person    DSM-5 Diagnoses:  Anxiety    Current Reported Symptoms and Status update:  Shar and Ed experience discrepancies in their levels of sexual desire. Shar is the higher desire partner and struggles with anxiety and worry related with not feeling desired, affecting is sexual and body esteem. Ed is the lower desiring partner and fears that Shar might leave him because he does not want to engage in sex as frequently. Ed expressed to require novelty to experience interest and to have kinky desires. The couple has loving and affectionate relationship, but are currently dealing with intimacy difficulties as Ed avoids sex. Both express difficulties and embarrassment related with communicating about sex. The couple has an open relationship, but with the exception of a neighbor couple that they have sex with separately, Shar seems to be only one engaging in sex outside of the relationship.     Areas of treatment focus:  1) Rebuild non-sexual intimacy, including touch.  2) Identify and express sexual desires, including kinks and fantasies.  3) Improve sexual communication.  4) Promote sexual novelty and sexual play.    Changes since last session: Shar requested Ed to abstain from having sex with one friend and Ed did not comply with this on two occasions. Shar feels hurt. Ed recognizes his mistake and reflected on his frustration this boundary. Ed has been intentional about cuddling and touch with Shar. Shar has been intentional showing his appreciation for Ed.     Progress Toward Treatment Goals:   Satisfactory  "progress.    Therapeutic Interventions/Treatment Strategies:    Area(s) of treatment focus addressed in this session included Interpersonal Relationship Skills and Sexual Health and Wellness.  Continued to work on the themes of \"(un)attractiveness\" and \"(under)appreciation\". Unpacked recent events and feelings. Used opportunity to clarify and update rules about disclosure, communication, and managing feelings for other people.     Patient Response:   Patient responded to session by accepting feedback, giving feedback, accepting support, and actively engaged. Possible barriers to participation / learning include: N/A    Current Mental Status Exam:   Appearance:  Appropriate   Eye Contact:  Good   Attitude / Demeanor: Cooperative   Speech      Rate / Production: Normal/ Responsive      Volume:  Normal  volume  Orientation:  All  Mood:   Normal, sad  Affect:   Appropriate   Thought Content: Clear   Insight:   Good       Plan/Need for Future Services:  Return for therapy in 2 weeks to treat diagnosed problems.    Patient has a current master individualized treatment plan.  See Epic treatment plan for more information.    Referral / Collaboration:  Referral to another professional/service is not indicated at this time.  Emergency Services Needed?  No    Assignment:  Ongoing: Invest quality time during meal time and prep.  Ongoing: Going to get massages at the mall and talk while walk around after going out (reconnecting ritual)   Continued: sensate focus  Past: kink orientation scale    Interactive Complexity:  There are four specific communication difficulties that complicate the work of the primary psychiatric procedure.  Interactive complexity (+15225) may be reported when at least one of these difficulties is present.    Communication difficulties present during current the psychiatric procedure include:  None.        Yolie Vaughn, PhD         I did not personally see the patient. I reviewed and agree with the " assessment and plan of this note.       Merced Lynn, PhD, LP    [facility] in [facility]  Department of Family Medicine and Community Health  [facility]

## 2025-02-24 ENCOUNTER — PATIENT OUTREACH (OUTPATIENT)
Dept: CARE COORDINATION | Facility: CLINIC | Age: 35
End: 2025-02-24
Payer: COMMERCIAL

## 2025-03-05 ENCOUNTER — LAB (OUTPATIENT)
Dept: LAB | Facility: CLINIC | Age: 35
End: 2025-03-05
Payer: COMMERCIAL

## 2025-03-05 DIAGNOSIS — Z11.3 SCREENING EXAMINATION FOR VENEREAL DISEASE: ICD-10-CM

## 2025-03-05 LAB — HIV 1+2 AB+HIV1 P24 AG SERPL QL IA: NONREACTIVE

## 2025-03-05 PROCEDURE — 86780 TREPONEMA PALLIDUM: CPT

## 2025-03-05 PROCEDURE — 87389 HIV-1 AG W/HIV-1&-2 AB AG IA: CPT

## 2025-03-05 PROCEDURE — 36415 COLL VENOUS BLD VENIPUNCTURE: CPT

## 2025-03-06 LAB
C TRACH DNA SPEC QL PROBE+SIG AMP: NEGATIVE
HIV1 RNA # PLAS NAA DL=20: NOT DETECTED COPIES/ML
N GONORRHOEA DNA SPEC QL NAA+PROBE: NEGATIVE
SPECIMEN TYPE: NORMAL
T PALLIDUM AB SER QL: NONREACTIVE

## 2025-03-10 ENCOUNTER — ALLIED HEALTH/NURSE VISIT (OUTPATIENT)
Dept: FAMILY MEDICINE | Facility: CLINIC | Age: 35
End: 2025-03-10
Payer: COMMERCIAL

## 2025-03-10 ENCOUNTER — PATIENT OUTREACH (OUTPATIENT)
Dept: CARE COORDINATION | Facility: CLINIC | Age: 35
End: 2025-03-10

## 2025-03-10 DIAGNOSIS — Z29.81 ENCOUNTER FOR HIV PRE-EXPOSURE PROPHYLAXIS: Primary | ICD-10-CM

## 2025-03-10 PROCEDURE — 99207 PR NO CHARGE NURSE ONLY: CPT

## 2025-03-10 PROCEDURE — 96372 THER/PROPH/DIAG INJ SC/IM: CPT | Performed by: INTERNAL MEDICINE

## 2025-03-10 NOTE — PROGRESS NOTES
"Clinic Administered Medication Documentation    New concerns today?: No    Ordering Provider: Dr. Mehta  Labs (HIV ag/ab plus HIV RNA) completed less than 7 days prior to today's injection? Yes  Last injection date: 25    Has the prior authorization been completed? Yes    Is there an active order (written within the past 365 days, with administrations remaining, not ) in the chart? Yes    Side effects with previous injections? Yes: Asthma flare with first injection.     Is patient taking any of the following medications?: No  Carbamazepine, oxcarbazepine, phenobarbital, phenytoin, rifabutin, rifampin, rifapentine.      Estimated body mass index is 23.05 kg/m  as calculated from the following:    Height as of 3/25/24: 1.748 m (5' 8.8\").    Weight as of 3/25/24: 70.4 kg (155 lb 3.2 oz).  Use 2 inch needle if BMI >30kg/m2  Shake the vial vigorously so that the suspension looks uniform before injection. Small air bubbles are expected and acceptable.  Draw up in syringe and administer in the ventrogluteal site.  The dorsogluteal (upper outer quadrant) is an acceptable alternative.  For IM administration only.    Administered Apretude (cabotegravir) to the Dorsogluteal site.  Administered on the left.    Vial/Syringe: Single dose vial. Was entire vial of medication used? Yes    Next labs: 8 weeks +/- 7 days  Next injection: 8 weeks +/- 7 days  Appointments scheduled: Yes: In 2 months     Chrissy PICKETT RN      "

## 2025-03-11 ENCOUNTER — OFFICE VISIT (OUTPATIENT)
Dept: PSYCHOLOGY | Facility: CLINIC | Age: 35
End: 2025-03-11
Payer: COMMERCIAL

## 2025-03-11 DIAGNOSIS — F41.1 GAD (GENERALIZED ANXIETY DISORDER): Primary | ICD-10-CM

## 2025-03-11 NOTE — PROGRESS NOTES
[facility] - Progress Note    Date of Service: 3/11/25   Name: Shar Diaz  : 1990  Medical Record Number: [id number]  Treating Provider: Yolie Panchal, PhD  Type of Session: Family with client present  Present in Session: Client and , Ed  Session Start and Stop Time: 4:00-4:55pm  Number of Minutes:  55 min    SERVICE MODALITY:  In-person    DSM-5 Diagnoses:  Anxiety    Current Reported Symptoms and Status update:  Shar and Ed experience discrepancies in their levels of sexual desire. Shar is the higher desire partner and struggles with anxiety and worry related with not feeling desired, affecting is sexual and body esteem. Ed is the lower desiring partner and fears that Shar might leave him because he does not want to engage in sex as frequently. Ed expressed to require novelty to experience interest and to have kinky desires. The couple has loving and affectionate relationship, but are currently dealing with intimacy difficulties as Ed avoids sex. Both express difficulties and embarrassment related with communicating about sex. The couple has an open relationship, but with the exception of a neighbor couple (Kyree & odalyslatrell) that they have sex with separately, Shar was the only one engaging in sex outside of the relationship, until .    Areas of treatment focus:  1) Rebuild non-sexual intimacy, including touch.  2) Identify and express sexual desires, including kinks and fantasies.  3) Improve sexual communication.  4) Promote sexual novelty and sexual play.    Changes since last session: Couple flew to [place] for cruise. Before boarding, Shar had conflict with Kyree and from that decided not to have sex together. This led to some awkward experiences in the cruise, as both couples were going as friends. Since back from the cruise Ed has seen Myron on multiple occasions and the three have had sex. Ed would like to move to [facility] and perhaps  "become a throuple.     Progress Toward Treatment Goals:   Satisfactory progress.    Therapeutic Interventions/Treatment Strategies:    Area(s) of treatment focus addressed in this session included Interpersonal Relationship Skills and Sexual Health and Wellness.  Continued to work on the themes of \"(un)attractiveness\" and \"(under)appreciation\". Explored feelings stemming from cruise and recent development in the relationship with Myron. Shar is hurt with ending relationship with Kyree, but believes is for the best. Voiced being afraid to loose Ed, as witnesses how happy he has been since this new relationship. Ed worries that is destroying Shar. Both focused on the things they felt could not provide to each other. Interventions focused on highlighting what connected them and what they were able to offer to each otherr.    Patient Response:   Patient responded to session by accepting feedback, giving feedback, accepting support, and actively engaged. Couple was emotional, tearful, and displaying signs of affection and care to one another.    Possible barriers to participation / learning include: N/A    Current Mental Status Exam:   Appearance:  Appropriate   Eye Contact:  Good   Attitude / Demeanor: Cooperative   Speech      Rate / Production: Normal/ Responsive      Volume:  Normal  volume  Orientation:  All  Mood:   Normal, sad  Affect:   Appropriate   Thought Content: Clear   Insight:   Good       Plan/Need for Future Services:  Return for therapy in 2 weeks to treat diagnosed problems.    Patient has a current master individualized treatment plan.  See Epic treatment plan for more information.    Referral / Collaboration:  Referral to another professional/service is not indicated at this time.  Emergency Services Needed?  No    Assignment:  Ongoing: Invest quality time during meal time and prep.  Ongoing: Going to get massages at the mall and talk while walk around after going out (reconnecting ritual) "   Continued: sensate focus  Past: kink orientation scale    Interactive Complexity:  There are four specific communication difficulties that complicate the work of the primary psychiatric procedure.  Interactive complexity (+95008) may be reported when at least one of these difficulties is present.    Communication difficulties present during current the psychiatric procedure include:  None.        Yolie Vaughn, PhD

## 2025-03-18 ENCOUNTER — ALLIED HEALTH/NURSE VISIT (OUTPATIENT)
Dept: FAMILY MEDICINE | Facility: CLINIC | Age: 35
End: 2025-03-18
Payer: COMMERCIAL

## 2025-03-18 DIAGNOSIS — J02.9 SORE THROAT: Primary | ICD-10-CM

## 2025-03-18 DIAGNOSIS — J02.0 STREP PHARYNGITIS: ICD-10-CM

## 2025-03-18 DIAGNOSIS — J02.0 STREPTOCOCCAL SORE THROAT: Primary | ICD-10-CM

## 2025-03-18 LAB — DEPRECATED S PYO AG THROAT QL EIA: POSITIVE

## 2025-03-18 PROCEDURE — 87880 STREP A ASSAY W/OPTIC: CPT

## 2025-03-18 PROCEDURE — 99207 PR NO CHARGE NURSE ONLY: CPT

## 2025-03-18 RX ORDER — AMOXICILLIN 500 MG/1
500 TABLET, FILM COATED ORAL 2 TIMES DAILY
Qty: 20 TABLET | Refills: 0 | Status: SHIPPED | OUTPATIENT
Start: 2025-03-18 | End: 2025-03-28

## 2025-04-21 ENCOUNTER — PATIENT OUTREACH (OUTPATIENT)
Dept: CARE COORDINATION | Facility: CLINIC | Age: 35
End: 2025-04-21
Payer: COMMERCIAL

## 2025-04-23 DIAGNOSIS — J45.20 MILD INTERMITTENT ASTHMA WITHOUT COMPLICATION: ICD-10-CM

## 2025-04-23 RX ORDER — ALBUTEROL SULFATE 90 UG/1
INHALANT RESPIRATORY (INHALATION)
Qty: 18 G | Refills: 5 | Status: SHIPPED | OUTPATIENT
Start: 2025-04-23

## 2025-05-07 ENCOUNTER — LAB (OUTPATIENT)
Dept: LAB | Facility: CLINIC | Age: 35
End: 2025-05-07
Payer: COMMERCIAL

## 2025-05-07 DIAGNOSIS — Z11.3 SCREENING EXAMINATION FOR VENEREAL DISEASE: ICD-10-CM

## 2025-05-07 LAB — T PALLIDUM AB SER QL: NONREACTIVE

## 2025-05-07 PROCEDURE — 86780 TREPONEMA PALLIDUM: CPT

## 2025-05-07 PROCEDURE — 87491 CHLMYD TRACH DNA AMP PROBE: CPT

## 2025-05-07 PROCEDURE — 87591 N.GONORRHOEAE DNA AMP PROB: CPT

## 2025-05-07 PROCEDURE — 36415 COLL VENOUS BLD VENIPUNCTURE: CPT

## 2025-05-07 PROCEDURE — 87389 HIV-1 AG W/HIV-1&-2 AB AG IA: CPT

## 2025-05-08 ENCOUNTER — RESULTS FOLLOW-UP (OUTPATIENT)
Dept: FAMILY MEDICINE | Facility: CLINIC | Age: 35
End: 2025-05-08

## 2025-05-08 LAB
C TRACH DNA SPEC QL PROBE+SIG AMP: NEGATIVE
HIV 1+2 AB+HIV1 P24 AG SERPL QL IA: NONREACTIVE
N GONORRHOEA DNA SPEC QL NAA+PROBE: NEGATIVE
SPECIMEN TYPE: NORMAL

## 2025-05-12 ENCOUNTER — ALLIED HEALTH/NURSE VISIT (OUTPATIENT)
Dept: FAMILY MEDICINE | Facility: CLINIC | Age: 35
End: 2025-05-12
Payer: COMMERCIAL

## 2025-05-12 DIAGNOSIS — Z29.81 ENCOUNTER FOR HIV PRE-EXPOSURE PROPHYLAXIS: Primary | ICD-10-CM

## 2025-05-12 PROCEDURE — 96372 THER/PROPH/DIAG INJ SC/IM: CPT | Performed by: INTERNAL MEDICINE

## 2025-05-12 PROCEDURE — 99207 PR NO CHARGE NURSE ONLY: CPT

## 2025-05-12 NOTE — PROGRESS NOTES
"Clinic Administered Medication Documentation    New concerns today?: No    Ordering Provider: Dr. Mehta  Labs (HIV ag/ab plus HIV RNA) completed less than 7 days prior to today's injection? Yes  Last injection date: 3/18/2025    Has the prior authorization been completed? Yes    Is there an active order (written within the past 365 days, with administrations remaining, not ) in the chart? Yes    Side effects with previous injections? Yes: Asthma flare with first injection.     Is patient taking any of the following medications?: No  Carbamazepine, oxcarbazepine, phenobarbital, phenytoin, rifabutin, rifampin, rifapentine.      Estimated body mass index is 23.05 kg/m  as calculated from the following:    Height as of 3/25/24: 1.748 m (5' 8.8\").    Weight as of 3/25/24: 70.4 kg (155 lb 3.2 oz).  Use 2 inch needle if BMI >30kg/m2  Shake the vial vigorously so that the suspension looks uniform before injection. Small air bubbles are expected and acceptable.  Draw up in syringe and administer in the ventrogluteal site.  The dorsogluteal (upper outer quadrant) is an acceptable alternative.  For IM administration only.    Administered Apretude (cabotegravir) to the Dorsogluteal site.  Administered on the right.    Vial/Syringe: Single dose vial. Was entire vial of medication used? Yes    Next labs: 8 weeks +/- 7 days  Next injection: 8 weeks +/- 7 days  Appointments scheduled: Yes    Chrissy PICKETT RN  "

## 2025-06-11 ENCOUNTER — MYC MEDICAL ADVICE (OUTPATIENT)
Dept: INTERNAL MEDICINE | Facility: CLINIC | Age: 35
End: 2025-06-11

## 2025-06-11 ENCOUNTER — OFFICE VISIT (OUTPATIENT)
Dept: INTERNAL MEDICINE | Facility: CLINIC | Age: 35
End: 2025-06-11
Payer: COMMERCIAL

## 2025-06-11 VITALS
HEIGHT: 69 IN | BODY MASS INDEX: 24.13 KG/M2 | OXYGEN SATURATION: 98 % | DIASTOLIC BLOOD PRESSURE: 70 MMHG | WEIGHT: 162.9 LBS | SYSTOLIC BLOOD PRESSURE: 110 MMHG | RESPIRATION RATE: 18 BRPM | HEART RATE: 51 BPM | TEMPERATURE: 98.4 F

## 2025-06-11 DIAGNOSIS — Z72.52 HIGH RISK HOMOSEXUAL BEHAVIOR: ICD-10-CM

## 2025-06-11 DIAGNOSIS — B00.9 HSV (HERPES SIMPLEX VIRUS) INFECTION: ICD-10-CM

## 2025-06-11 DIAGNOSIS — L98.9 SKIN LESION: ICD-10-CM

## 2025-06-11 DIAGNOSIS — J30.2 SEASONAL ALLERGIC RHINITIS, UNSPECIFIED TRIGGER: ICD-10-CM

## 2025-06-11 DIAGNOSIS — L20.82 FLEXURAL ECZEMA: ICD-10-CM

## 2025-06-11 DIAGNOSIS — L70.0 ACNE VULGARIS: ICD-10-CM

## 2025-06-11 DIAGNOSIS — Z20.2 CONTACT WITH AND (SUSPECTED) EXPOSURE TO INFECTIONS WITH A PREDOMINANTLY SEXUAL MODE OF TRANSMISSION: ICD-10-CM

## 2025-06-11 DIAGNOSIS — Z00.00 ANNUAL PHYSICAL EXAM: Primary | ICD-10-CM

## 2025-06-11 DIAGNOSIS — Z29.81 ENCOUNTER FOR HIV PRE-EXPOSURE PROPHYLAXIS: ICD-10-CM

## 2025-06-11 DIAGNOSIS — K42.9 UMBILICAL HERNIA WITHOUT OBSTRUCTION AND WITHOUT GANGRENE: ICD-10-CM

## 2025-06-11 DIAGNOSIS — L70.0 ACNE VULGARIS: Primary | ICD-10-CM

## 2025-06-11 DIAGNOSIS — F41.9 ANXIETY: ICD-10-CM

## 2025-06-11 DIAGNOSIS — J45.30 MILD PERSISTENT ASTHMA WITHOUT COMPLICATION: ICD-10-CM

## 2025-06-11 PROCEDURE — 3074F SYST BP LT 130 MM HG: CPT | Performed by: NURSE PRACTITIONER

## 2025-06-11 PROCEDURE — 99395 PREV VISIT EST AGE 18-39: CPT | Performed by: NURSE PRACTITIONER

## 2025-06-11 PROCEDURE — 3078F DIAST BP <80 MM HG: CPT | Performed by: NURSE PRACTITIONER

## 2025-06-11 PROCEDURE — G2211 COMPLEX E/M VISIT ADD ON: HCPCS | Performed by: NURSE PRACTITIONER

## 2025-06-11 PROCEDURE — 99214 OFFICE O/P EST MOD 30 MIN: CPT | Mod: 25 | Performed by: NURSE PRACTITIONER

## 2025-06-11 RX ORDER — TRIAMCINOLONE ACETONIDE 1 MG/G
OINTMENT TOPICAL 2 TIMES DAILY
Qty: 30 G | Refills: 1 | Status: SHIPPED | OUTPATIENT
Start: 2025-06-11

## 2025-06-11 RX ORDER — BUDESONIDE AND FORMOTEROL FUMARATE DIHYDRATE 80; 4.5 UG/1; UG/1
1-2 AEROSOL RESPIRATORY (INHALATION) EVERY 6 HOURS PRN
Qty: 10.2 G | Refills: 3 | Status: SHIPPED | OUTPATIENT
Start: 2025-06-11

## 2025-06-11 RX ORDER — CITALOPRAM HYDROBROMIDE 20 MG/1
20 TABLET ORAL DAILY
Qty: 90 TABLET | Refills: 3 | Status: SHIPPED | OUTPATIENT
Start: 2025-06-11

## 2025-06-11 RX ORDER — DOXYCYCLINE 100 MG/1
200 CAPSULE ORAL
Qty: 30 CAPSULE | Refills: 11 | Status: SHIPPED | OUTPATIENT
Start: 2025-06-11

## 2025-06-11 RX ORDER — VALACYCLOVIR HYDROCHLORIDE 1 G/1
1000 TABLET, FILM COATED ORAL 2 TIMES DAILY
Qty: 20 TABLET | Refills: 4 | Status: SHIPPED | OUTPATIENT
Start: 2025-06-11 | End: 2025-06-14

## 2025-06-11 SDOH — HEALTH STABILITY: PHYSICAL HEALTH: ON AVERAGE, HOW MANY MINUTES DO YOU ENGAGE IN EXERCISE AT THIS LEVEL?: 90 MIN

## 2025-06-11 SDOH — HEALTH STABILITY: PHYSICAL HEALTH: ON AVERAGE, HOW MANY DAYS PER WEEK DO YOU ENGAGE IN MODERATE TO STRENUOUS EXERCISE (LIKE A BRISK WALK)?: 6 DAYS

## 2025-06-11 ASSESSMENT — ASTHMA QUESTIONNAIRES
QUESTION_2 LAST FOUR WEEKS HOW OFTEN HAVE YOU HAD SHORTNESS OF BREATH: NOT AT ALL
QUESTION_5 LAST FOUR WEEKS HOW WOULD YOU RATE YOUR ASTHMA CONTROL: COMPLETELY CONTROLLED
ACT_TOTALSCORE: 25
QUESTION_3 LAST FOUR WEEKS HOW OFTEN DID YOUR ASTHMA SYMPTOMS (WHEEZING, COUGHING, SHORTNESS OF BREATH, CHEST TIGHTNESS OR PAIN) WAKE YOU UP AT NIGHT OR EARLIER THAN USUAL IN THE MORNING: NOT AT ALL
QUESTION_4 LAST FOUR WEEKS HOW OFTEN HAVE YOU USED YOUR RESCUE INHALER OR NEBULIZER MEDICATION (SUCH AS ALBUTEROL): NOT AT ALL
QUESTION_1 LAST FOUR WEEKS HOW MUCH OF THE TIME DID YOUR ASTHMA KEEP YOU FROM GETTING AS MUCH DONE AT WORK, SCHOOL OR AT HOME: NONE OF THE TIME

## 2025-06-11 ASSESSMENT — ANXIETY QUESTIONNAIRES
IF YOU CHECKED OFF ANY PROBLEMS ON THIS QUESTIONNAIRE, HOW DIFFICULT HAVE THESE PROBLEMS MADE IT FOR YOU TO DO YOUR WORK, TAKE CARE OF THINGS AT HOME, OR GET ALONG WITH OTHER PEOPLE: NOT DIFFICULT AT ALL
GAD7 TOTAL SCORE: 6
3. WORRYING TOO MUCH ABOUT DIFFERENT THINGS: SEVERAL DAYS
6. BECOMING EASILY ANNOYED OR IRRITABLE: SEVERAL DAYS
1. FEELING NERVOUS, ANXIOUS, OR ON EDGE: SEVERAL DAYS
5. BEING SO RESTLESS THAT IT IS HARD TO SIT STILL: MORE THAN HALF THE DAYS
7. FEELING AFRAID AS IF SOMETHING AWFUL MIGHT HAPPEN: NOT AT ALL
2. NOT BEING ABLE TO STOP OR CONTROL WORRYING: NOT AT ALL
GAD7 TOTAL SCORE: 6

## 2025-06-11 ASSESSMENT — SOCIAL DETERMINANTS OF HEALTH (SDOH): HOW OFTEN DO YOU GET TOGETHER WITH FRIENDS OR RELATIVES?: MORE THAN THREE TIMES A WEEK

## 2025-06-11 ASSESSMENT — PATIENT HEALTH QUESTIONNAIRE - PHQ9: 5. POOR APPETITE OR OVEREATING: SEVERAL DAYS

## 2025-06-11 NOTE — PATIENT INSTRUCTIONS
Patient Education   Preventive Care Advice   This is general advice given by our system to help you stay healthy. However, your care team may have specific advice just for you. Please talk to your care team about your preventive care needs.  Nutrition  Eat 5 or more servings of fruits and vegetables each day.  Try wheat bread, brown rice and whole grain pasta (instead of white bread, rice, and pasta).  Get enough calcium and vitamin D. Check the label on foods and aim for 100% of the RDA (recommended daily allowance).  Lifestyle  Exercise at least 150 minutes each week  (30 minutes a day, 5 days a week).  Do muscle strengthening activities 2 days a week. These help control your weight and prevent disease.  No smoking.  Wear sunscreen to prevent skin cancer.  Have a dental exam and cleaning every 6 months.  Yearly exams  See your health care team every year to talk about:  Any changes in your health.  Any medicines your care team has prescribed.  Preventive care, family planning, and ways to prevent chronic diseases.  Shots (vaccines)   HPV shots (up to age 26), if you've never had them before.  Hepatitis B shots (up to age 59), if you've never had them before.  COVID-19 shot: Get this shot when it's due.  Flu shot: Get a flu shot every year.  Tetanus shot: Get a tetanus shot every 10 years.  Pneumococcal, hepatitis A, and RSV shots: Ask your care team if you need these based on your risk.  Shingles shot (for age 50 and up)  General health tests  Diabetes screening:  Starting at age 35, Get screened for diabetes at least every 3 years.  If you are younger than age 35, ask your care team if you should be screened for diabetes.  Cholesterol test: At age 39, start having a cholesterol test every 5 years, or more often if advised.  Bone density scan (DEXA): At age 50, ask your care team if you should have this scan for osteoporosis (brittle bones).  Hepatitis C: Get tested at least once in your life.  STIs (sexually  transmitted infections)  Before age 24: Ask your care team if you should be screened for STIs.  After age 24: Get screened for STIs if you're at risk. You are at risk for STIs (including HIV) if:  You are sexually active with more than one person.  You don't use condoms every time.  You or a partner was diagnosed with a sexually transmitted infection.  If you are at risk for HIV, ask about PrEP medicine to prevent HIV.  Get tested for HIV at least once in your life, whether you are at risk for HIV or not.  Cancer screening tests  Cervical cancer screening: If you have a cervix, begin getting regular cervical cancer screening tests starting at age 21.  Breast cancer scan (mammogram): If you've ever had breasts, begin having regular mammograms starting at age 40. This is a scan to check for breast cancer.  Colon cancer screening: It is important to start screening for colon cancer at age 45.  Have a colonoscopy test every 10 years (or more often if you're at risk) Or, ask your provider about stool tests like a FIT test every year or Cologuard test every 3 years.  To learn more about your testing options, visit:   .  For help making a decision, visit:   https://bit.ly/wj38137.  Prostate cancer screening test: If you have a prostate, ask your care team if a prostate cancer screening test (PSA) at age 55 is right for you.  Lung cancer screening: If you are a current or former smoker ages 50 to 80, ask your care team if ongoing lung cancer screenings are right for you.  For informational purposes only. Not to replace the advice of your health care provider. Copyright   2023 [facility]. All rights reserved. Clinically reviewed by the [facility]itions [facility]. [employer]Â® 369018 - REV 01/24.  Learning About Stress  What is stress?     Stress is your body's response to a hard situation. Your body can have a physical, emotional, or mental response. Stress is a fact of life for most people, and it  affects everyone differently. What causes stress for you may not be stressful for someone else.  A lot of things can cause stress. You may feel stress when you go on a job interview, take a test, or run a race. This kind of short-term stress is normal and even useful. It can help you if you need to work hard or react quickly. For example, stress can help you finish an important job on time.  Long-term stress is caused by ongoing stressful situations or events. Examples of long-term stress include long-term health problems, ongoing problems at work, or conflicts in your family. Long-term stress can harm your health.  How does stress affect your health?  When you are stressed, your body responds as though you are in danger. It makes hormones that speed up your heart, make you breathe faster, and give you a burst of energy. This is called the fight-or-flight stress response. If the stress is over quickly, your body goes back to normal and no harm is done.  But if stress happens too often or lasts too long, it can have bad effects. Long-term stress can make you more likely to get sick, and it can make symptoms of some diseases worse. If you tense up when you are stressed, you may develop neck, shoulder, or low back pain. Stress is linked to high blood pressure and heart disease.  Stress also harms your emotional health. It can make you gooden, tense, or depressed. Your relationships may suffer, and you may not do well at work or school.  What can you do to manage stress?  You can try these things to help manage stress:   Do something active. Exercise or activity can help reduce stress. Walking is a great way to get started. Even everyday activities such as housecleaning or yard work can help.  Try yoga or zhen chi. These techniques combine exercise and meditation. You may need some training at first to learn them.  Do something you enjoy. For example, listen to music or go to a movie. Practice your hobby or do volunteer  "work.  Meditate. This can help you relax, because you are not worrying about what happened before or what may happen in the future.  Do guided imagery. Imagine yourself in any setting that helps you feel calm. You can use online videos, books, or a teacher to guide you.  Do breathing exercises. For example:  From a standing position, bend forward from the waist with your knees slightly bent. Let your arms dangle close to the floor.  Breathe in slowly and deeply as you return to a standing position. Roll up slowly and lift your head last.  Hold your breath for just a few seconds in the standing position.  Breathe out slowly and bend forward from the waist.  Let your feelings out. Talk, laugh, cry, and express anger when you need to. Talking with supportive friends or family, a counselor, or a christopher leader about your feelings is a healthy way to relieve stress. Avoid discussing your feelings with people who make you feel worse.  Write. It may help to write about things that are bothering you. This helps you find out how much stress you feel and what is causing it. When you know this, you can find better ways to cope.  What can you do to prevent stress?  You might try some of these things to help prevent stress:  Manage your time. This helps you find time to do the things you want and need to do.  Get enough sleep. Your body recovers from the stresses of the day while you are sleeping.  Get support. Your family, friends, and community can make a difference in how you experience stress.  Limit your news feed. Avoid or limit time on social media or news that may make you feel stressed.  Do something active. Exercise or activity can help reduce stress. Walking is a great way to get started.  Where can you learn more?  Go to https://www.[employer].net/patiented  Enter N032 in the search box to learn more about \"Learning About Stress.\"  Current as of: October 24, 2024  Content Version: 14.5 2024-2025 [employer] " "LL[employer].   Care instructions adapted under license by your healthcare professional. If you have questions about a medical condition or this instruction, always ask your healthcare professional. [employer] disclaims any warranty or liability for your use of this information.    9 Ways to Cut Back on Drinking  Maybe you've found yourself drinking more alcohol than you'd prefer. If you want to cut back, here are some ideas to try.    Think before you drink.  Do you really want a drink, or is it just a habit? If you're used to having a drink at a certain time, try doing something else then.     Look for substitutes.  Find some no-alcohol drinks that you enjoy, like flavored seltzer water, tea with honey, or tonic with a slice of lime. Or try alcohol-free beer or \"virgin\" cocktails (without the alcohol).     Drink more water.  Use water to quench your thirst. Drink a glass of water before you have any alcohol. Have another glass along with every drink or between drinks.     Shrink your drink.  For example, have a bottle of beer instead of a pint. Use a smaller glass for wine. Choose drinks with lower alcohol content (ABV%). Or use less liquor and more mixer in cocktails.     Slow down.  It's easy to drink quickly and without thinking about it. Pay attention, and make each drink last longer.     Do the math.  Total up how much you spend on alcohol each month. How much is that a year? If you cut back, what could you do with the money you save?     Take a break.  Choose a day or two each week when you won't drink at all. Notice how you feel on those days, physically and emotionally. How did you sleep? Do you feel better? Over time, add more break days.     Count calories.  Would you like to lose some weight? For some people that's a good motivator for cutting back. Figure out how many calories are in each drink. How many does that add up to in a day? In a week? In a month?     Practice saying no.  Be ready when " "someone offers you a drink. Try: \"Thanks, I've had enough.\" Or \"Thanks, but I'm cutting back.\" Or \"No, thanks. I feel better when I drink less.\"   Current as of: August 20, 2024  Content Version: 14.5 2024-2025 [employer].   Care instructions adapted under license by your healthcare professional. If you have questions about a medical condition or this instruction, always ask your healthcare professional. [employer] disclaims any warranty or liability for your use of this information.  Substance Use Disorder: Care Instructions  Overview     You can improve your life and health by stopping your use of alcohol or drugs. When you don't drink or use drugs, you may feel and sleep better. You may get along better with your family, friends, and coworkers. There are medicines and programs that can help with substance use disorder.  How can you care for yourself at home?  Here are some ways to help you stay sober and prevent relapse.  If you have been given medicine to help keep you sober or reduce your cravings, be sure to take it exactly as prescribed.  Talk to your doctor about programs that can help you stop using drugs or drinking alcohol.  Do not keep alcohol or drugs in your home.  Plan ahead. Think about what you'll say if other people ask you to drink or use drugs. Try not to spend time with people who drink or use drugs.  Use the time and money spent on drinking or drugs to do something that's important to you.  Preventing a relapse  Have a plan to deal with relapse. Learn to recognize changes in your thinking that lead you to drink or use drugs. Get help before you start to drink or use drugs again.  Try to stay away from situations, friends, or places that may lead you to drink or use drugs.  If you feel the need to drink alcohol or use drugs again, seek help right away. Call a trusted friend or family member. Some people get support from organizations such as Narcotics Anonymous or " [facility] or from treatment facilities.  If you relapse, get help as soon as you can. Some people make a plan with another person that outlines what they want that person to do for them if they relapse. The plan usually includes how to handle the relapse and who to notify in case of relapse.  Don't give up. Remember that a relapse doesn't mean that you have failed. Use the experience to learn the triggers that lead you to drink or use drugs. Then quit again. Recovery is a lifelong process. Many people have several relapses before they are able to quit for good.  Follow-up care is a key part of your treatment and safety. Be sure to make and go to all appointments, and call your doctor if you are having problems. It's also a good idea to know your test results and keep a list of the medicines you take.  When should you call for help?   Call 911  anytime you think you may need emergency care. For example, call if you or someone else:    Has overdosed or has withdrawal signs. Be sure to tell the emergency workers that you are or someone else is using or trying to quit using drugs. Overdose or withdrawal signs may include:  Losing consciousness.  Seizure.  Seeing or hearing things that aren't there (hallucinations).     Is thinking or talking about suicide or harming others.   Where to get help 24 hours a day, 7 days a week   If you or someone you know talks about suicide, self-harm, a mental health crisis, a substance use crisis, or any other kind of emotional distress, get help right away. You can:    Call the Suicide and Crisis Lifeline at 988.     Call 5-575-281-YAYM (1-297.239.3061).     Text HOME to 945536 to access the Crisis Text Line.   Consider saving these numbers in your phone.  Go to [employer]YOUnite for more information or to chat online.  Call your doctor now or seek immediate medical care if:    You are having withdrawal symptoms. These may include nausea or vomiting, sweating, shakiness, and anxiety.  "  Watch closely for changes in your health, and be sure to contact your doctor if:    You have a relapse.     You need more help or support to stop.   Where can you learn more?  Go to https://www.[employer].net/patiented  Enter H573 in the search box to learn more about \"Substance Use Disorder: Care Instructions.\"  Current as of: August 20, 2024  Content Version: 14.5 2024-2025 [employer].   Care instructions adapted under license by your healthcare professional. If you have questions about a medical condition or this instruction, always ask your healthcare professional. [employer] disclaims any warranty or liability for your use of this information.       "

## 2025-06-11 NOTE — PROGRESS NOTES
Preventive Care Visit  [facility]  LUCY Santiago CNP, Internal Medicine  Jun 11, 2025    Assessment & Plan     (Z00.00) Annual physical exam  (primary encounter diagnosis)  Comment: Past medical history, surgical history, family history, social history, sexual history, medications, allergies, and immunizations reviewed and updated as appropriate.   Care gaps addressed, including routine screenings.  Annual lab work ordered.  Physical exam unremarkable, except as noted.  Diet/exercise recommendations discussed.  Reviewed substance use recommendations; he does endorse binge drinking about once monthly.  Plan: Comprehensive metabolic panel    (J45.30) Mild persistent asthma without complication  Comment: Stable on current medications.  Medication refills provided.  Plan: budesonide-formoterol (SYMBICORT/BREYNA) 80-4.5        MCG/ACT inhaler    (J30.2) Seasonal allergic rhinitis, unspecified trigger  Comment: Stable on current medications.    (L20.82) Flexural eczema  Comment: Stable on current medications.  Medication refill provided.  Plan: triamcinolone (KENALOG) 0.1 % external ointment    (L70.0) Acne vulgaris  Comment: Stable on current medications.  Medication refill provided.  Plan: benzoyl peroxide 5 % external liquid    (L98.9) Skin lesion  Comment: Referral to dermatology for further evaluation and management, per patient preference.  Plan: Adult Dermatology  Referral    (K42.9) Umbilical hernia without obstruction and without gangrene  Comment: Noting for records.  Denies need for intervention today.    (F41.9) Anxiety  Comment: Stable on current medications.  Medication refill provided.  Followed by counselor.  Plan: citalopram (CELEXA) 20 MG tablet    (B00.9) HSV (herpes simplex virus) infection  Comment: Stable on current medications.  Medication refill provided.  Plan: valACYclovir (VALTREX) 1000 mg tablet,     (Z29.81) Encounter for HIV pre-exposure  prophylaxis  Comment: Stable on Apretude; standing order updated. Plan to continue injections at [facility] location.  Labs ordered for monitoring.  Plan: cabotegravir ER (APRETUDE) injection 600 mg,         PRIMARY CARE FOLLOW-UP SCHEDULING,         Comprehensive metabolic panel    (Z20.2) Contact with and (suspected) exposure to infections with a predominantly sexual mode of transmission  (Z72.52) High risk homosexual behavior  Comment: STI standing orders updated.  Doxypep continued.  Plan: First voided urine-Chlamydia         trachomatis/Neisseria gonorrhoeae by PCR,         Throat-Chlamydia trachomatis/Neisseria         gonorrhoeae by PCR, Rectum-Chlamydia         trachomatis/Neisseria gonorrhoeae by PCR, HIV         Antigen Antibody Combo Cascade, Treponema Abs w        Reflex to RPR and Titer, doxycycline hyclate         (VIBRAMYCIN) 100 MG capsule, HIV-1 RNA         quantitative, cabotegravir ER (APRETUDE)         injection 600 mg     Patient has been advised of split billing requirements and indicates understanding: Yes    Counseling  Appropriate preventive services were addressed with this patient via screening, questionnaire, or discussion as appropriate for fall prevention, nutrition, physical activity, Tobacco-use cessation, social engagement, weight loss and cognition.  Checklist reviewing preventive services available has been given to the patient.  Reviewed patient's diet, addressing concerns and/or questions.   He is at risk for psychosocial distress and has been provided with information to reduce risk.   The patient reports drinking more than 3 alcoholic drinks per day and/or more than 7 drhnks per week. The patient was counseled and given information about possible harmful effects of excessive alcohol intake.    Follow-up    Follow-up Visit   Expected date:  Jun 11, 2026 (Approximate)      Follow Up Appointment Details:     Follow-up with whom?: PCP    Follow-Up for what?: Adult Preventive     How?: In Person             Follow-up Visit  (2 months)  Expires on: Jun 11, 2026      Follow Up Appointment Details:     Follow-up with whom?: Other Primary Care Services    Follow-Up for what?: Nurse Visit (RN)    How?: In Person             The longitudinal plan of care for the diagnosis(es)/condition(s) as documented were addressed during this visit. Due to the added complexity in care, I will continue to support Shar in the subsequent management and with ongoing continuity of care.        Timmy Myles is a 35 year old, presenting for the following:  Physical    Here for annual exam and to establish care. Is transferring care from another [facility].  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    The following concerns were also addressed today:  Skin lesions: Concerned about facial lesions; wondering if one of them is basal cell carcinoma but does not remember which one without a mirror in front of him. History of tanning bed use; inconsistent sun screen use. No family history of skin cancer. Also has history of flexural eczema (in good control; very occasional kenalog cream) and acne (uses benzoyl peroxide 5% topical PRN).  Asthma: In good control on current medications. Typically uses Symbicort only when he is ill with URI, which tends to flare asthma symptoms, but not at baseline. +allergies, but do not exacerbate asthma symptoms as much as URI.  Mood: History of anxiety, primarily related to work. Stable on current medications; needs refill. Has a counselor.  Umbilical hernia:Small, soft, reducible, non-tender. Present for a couple years. Just mentioning for records; declines intervention today.   PrEP: Open relationship with ; each may have multiple partners. Has been on doxypep and IM Apretude for the last few years and is tolerating both well. Has been consistent in STI checks, particularly prior to Apretude injections as required. Denies any adverse effects.    Lipid panel completed 1 year ago was  normal; recheck in 1-2 years.    Health Maintenance:  Vaccines due: up to date; reports he received COVID vaccine in the 9571-7798 season, though this is not documented in his EHR. He will look for records.  Colon cancer screening: Due at age 45. Family history of colon cancer?: no  Prostate cancer screening: Urinary symptoms?: none. Family history of prostate cancer? no  DEXA: N/A.  Screening chest CT: N/A. Family history of lung cancer?: no  AAA screen: N/A  Vision: No concerns.   Dental: No concerns.   Hearing: No concerns.     Sexual Health History:  Partners:Male;  with open relationship  History of STI?: Yes; HSV. Valacyclovir PRN        6/11/2025   General Health   How would you rate your overall physical health? Good   Feel stress (tense, anxious, or unable to sleep) To some extent   (!) STRESS CONCERN      6/11/2025   Nutrition   Three or more servings of calcium each day? Yes   Diet: Regular (no restrictions)   How many servings of fruit and vegetables per day? (!) 2-3   How many sweetened beverages each day? 0-1         6/11/2025   Exercise   Days per week of moderate/strenous exercise 6 days   Average minutes spent exercising at this level 90 min         6/11/2025   Social Factors   Frequency of gathering with friends or relatives More than three times a week   Worry food won't last until get money to buy more No   Food not last or not have enough money for food? No   Do you have housing? (Housing is defined as stable permanent housing and does not include staying outside in a car, in a tent, in an abandoned building, in an overnight shelter, or couch-surfing.) Yes   Are you worried about losing your housing? No   Lack of transportation? No   Unable to get utilities (heat,electricity)? No         6/11/2025   Dental   Dentist two times every year? Yes     Today's PHQ-2 Score:       6/11/2025     2:36 PM   PHQ-2 ( 1999 [employer])   Q1: Little interest or pleasure in doing things 0   Q2: Feeling down,  depressed or hopeless 0   PHQ-2 Score 0    Q1: Little interest or pleasure in doing things Not at all   Q2: Feeling down, depressed or hopeless Not at all   PHQ-2 Score 0       Patient-reported         6/11/2025     2:58 PM   CHAPITO-7 SCORE   Total Score 6         6/11/2025   Substance Use   Alcohol more than 3/day or more than 7/wk Yes   How often do you have a drink containing alcohol 2 to 3 times a week   How many alcohol drinks on typical day 3 or 4   How often do you have 5+ drinks at one occasion Monthly   Audit 2/3 Score 3   How often not able to stop drinking once started Never   How often failed to do what normally expected Never   How often needed first drink in am after a heavy drinking session Never   How often feeling of guilt or remorse after drinking Never   How often unable to remember what happened the night before Never   Have you or someone else been injured because of your drinking No   Has anyone been concerned or suggested you cut down on drinking No   TOTAL SCORE - AUDIT 6   Do you use any other substances recreationally? (!) OTHER; psylocybin     Social History     Tobacco Use    Smoking status: Never     Passive exposure: Never    Smokeless tobacco: Never   Vaping Use    Vaping status: Never Used   Substance Use Topics    Alcohol use: Yes     Comment: typically 1 drink 2x/week; will have 5-6 drinks in one sitting monthly    Drug use: Yes     Types: Psilocybin     Comment: 1x every other week         6/11/2025   STI Screening   New sexual partner(s) since last STI/HIV test? No         6/11/2025   Contraception/Family Planning   Questions about contraception or family planning No     Reviewed and updated as needed this visit by Provider   Tobacco   Meds   Med Hx  Surg Hx  Fam Hx  Soc Hx Sexual Activity            Review of Systems  Constitutional, HEENT, cardiovascular, pulmonary, GI, , musculoskeletal, neuro, skin, endocrine and psych systems are negative, except as otherwise noted.    "  Objective    Exam  /70   Pulse 51   Temp 98.4  F (36.9  C) (Temporal)   Resp 18   Ht 1.746 m (5' 8.75\")   Wt 73.9 kg (162 lb 14.4 oz)   SpO2 98%   BMI 24.23 kg/m     Estimated body mass index is 24.23 kg/m  as calculated from the following:    Height as of this encounter: 1.746 m (5' 8.75\").    Weight as of this encounter: 73.9 kg (162 lb 14.4 oz).    Physical Exam  Vitals and nursing note reviewed.   Constitutional:       Appearance: Normal appearance.   HENT:      Head: Normocephalic and atraumatic.      Right Ear: Tympanic membrane, ear canal and external ear normal.      Left Ear: Tympanic membrane, ear canal and external ear normal.      Nose: Nose normal.      Mouth/Throat:      Pharynx: Oropharynx is clear.   Eyes:      Extraocular Movements: Extraocular movements intact.      Conjunctiva/sclera: Conjunctivae normal.      Pupils: Pupils are equal, round, and reactive to light.   Neck:      Vascular: No carotid bruit.   Cardiovascular:      Rate and Rhythm: Normal rate and regular rhythm.      Pulses: Normal pulses.      Heart sounds: Normal heart sounds. No murmur heard.     No friction rub. No gallop.   Pulmonary:      Effort: Pulmonary effort is normal. No respiratory distress.      Breath sounds: Normal breath sounds. No wheezing, rhonchi or rales.   Abdominal:      General: Bowel sounds are normal. There is no distension.      Palpations: Abdomen is soft. There is no mass.      Tenderness: There is no abdominal tenderness. There is no guarding.      Hernia: A hernia is present. Hernia is present in the umbilical area (very small; reducible; non-tender; only on straining).   Musculoskeletal:         General: No swelling. Normal range of motion.      Cervical back: Normal range of motion and neck supple.   Skin:     General: Skin is warm and dry.      Findings: Rash present. Rash is papular (face; scattered. Similar appearance for all lesions).   Neurological:      General: No focal deficit " present.      Mental Status: He is alert and oriented to person, place, and time.   Psychiatric:         Mood and Affect: Mood normal.         Behavior: Behavior normal.         Thought Content: Thought content normal.         Judgment: Judgment normal.       Signed Electronically by: LUCY Santiago CNP

## 2025-06-11 NOTE — LETTER
My Asthma Action Plan    Name: Shar Diaz   YOB: 1990  Date: 6/11/2025   My doctor: LUCY Santiago CNP   My clinic: [facility]        My Control Medicine: Budesonide + formoterol (Symbicort HFA) -  80/4.5 mcg    My Rescue Medicine: Albuterol (Proair/Ventolin/Proventil HFA) 2-4 puffs EVERY 4 HOURS as needed. Use a spacer if recommended by your provider.   My Asthma Severity:   Mild Persistent  Know your asthma triggers: upper respiratory infections               GREEN ZONE   Good Control  I feel good  No cough or wheeze  Can work, sleep and play without asthma symptoms       Take your asthma control medicine every day.     If exercise triggers your asthma, take your rescue medication  15 minutes before exercise or sports, and  During exercise if you have asthma symptoms  Spacer to use with inhaler: If you have a spacer, make sure to use it with your inhaler             YELLOW ZONE Getting Worse  I have ANY of these:  I do not feel good  Cough or wheeze  Chest feels tight  Wake up at night   Keep taking your Green Zone medications  Start taking your rescue medicine:  every 20 minutes for up to 1 hour. Then every 4 hours for 24-48 hours.  If you stay in the Yellow Zone for more than 12-24 hours, contact your doctor.  If you do not return to the Green Zone in 12-24 hours or you get worse, start taking your oral steroid medicine if prescribed by your provider.           RED ZONE Medical Alert - Get Help  I have ANY of these:  I feel awful  Medicine is not helping  Breathing getting harder  Trouble walking or talking  Nose opens wide to breathe       Take your rescue medicine NOW  If your provider has prescribed an oral steroid medicine, start taking it NOW  Call your doctor NOW  If you are still in the Red Zone after 20 minutes and you have not reached your doctor:  Take your rescue medicine again and  Call 911 or go to the emergency room right away    See  your regular doctor within 2 weeks of an Emergency Room or Urgent Care visit for follow-up treatment.          Annual Reminders:  Meet with Asthma Educator,  Flu Shot in the Fall, consider Pneumonia Vaccination for patients with asthma (aged 19 and older).    Pharmacy: [facility] - [place], [place] - [address]    Electronically signed by LUCY Santiago CNP   Date: 06/11/25                      Asthma Triggers  How To Control Things That Make Your Asthma Worse    Triggers are things that make your asthma worse.  Look at the list below to help you find your triggers and what you can do about them.  You can help prevent asthma flare-ups by staying away from your triggers.      Trigger                                                          What you can do   Cigarette Smoke  Tobacco smoke can make asthma worse. Do not allow smoking in your home, car or around you.  Be sure no one smokes at a child s day care or school.  If you smoke, ask your health care provider for ways to help you quit.  Ask family members to quit too.  Ask your health care provider for a referral to Quit Plan to help you quit smoking, or call 7-102-807-PLAN.     Colds, Flu, Bronchitis  These are common triggers of asthma. Wash your hands often.  Don t touch your eyes, nose or mouth.  Get a flu shot every year.     Dust Mites  These are tiny bugs that live in cloth or carpet. They are too small to see. Wash sheets and blankets in hot water every week.   Encase pillows and mattress in dust mite proof covers.  Avoid having carpet if you can. If you have carpet, vacuum weekly.   Use a dust mask and HEPA vacuum.   Pollen and Outdoor Mold  Some people are allergic to trees, grass, or weed pollen, or molds. Try to keep your windows closed.  Limit time out doors when pollen count is high.   Ask you health care provider about taking medicine during allergy season.     Animal Dander  Some people are allergic to skin  flakes, urine or saliva from pets with fur or feathers. Keep pets with fur or feathers out of your home.    If you can t keep the pet outdoors, then keep the pet out of your bedroom.  Keep the bedroom door closed.  Keep pets off cloth furniture and away from stuffed toys.     Mice, Rats, and Cockroaches   Some people are allergic to the waste from these pests.   Cover food and garbage.  Clean up spills and food crumbs.  Store grease in the refrigerator.   Keep food out of the bedroom.   Indoor Mold  This can be a trigger if your home has high moisture. Fix leaking faucets, pipes, or other sources of water.   Clean moldy surfaces.  Dehumidify basement if it is damp and smelly.   Smoke, Strong Odors, and Sprays  These can reduce air quality. Stay away from strong odors and sprays, such as perfume, powder, hair spray, paints, smoke incense, paint, cleaning products, candles and new carpet.   Exercise or Sports  Some people with asthma have this trigger. Be active!  Ask your doctor about taking medicine before sports or exercise to prevent symptoms.    Warm up for 5-10 minutes before and after sports or exercise.     Other Triggers of Asthma  Cold air:  Cover your nose and mouth with a scarf.  Sometimes laughing or crying can be a trigger.  Some medicines and food can trigger asthma.

## 2025-06-12 ENCOUNTER — PATIENT OUTREACH (OUTPATIENT)
Dept: CARE COORDINATION | Facility: CLINIC | Age: 35
End: 2025-06-12
Payer: COMMERCIAL

## 2025-06-12 DIAGNOSIS — R11.0 NAUSEA: Primary | ICD-10-CM

## 2025-06-12 RX ORDER — ONDANSETRON 4 MG/1
4 TABLET, ORALLY DISINTEGRATING ORAL EVERY 8 HOURS PRN
Qty: 30 TABLET | Refills: 1 | Status: SHIPPED | OUTPATIENT
Start: 2025-06-12

## 2025-06-12 RX ORDER — TRETINOIN 0.5 MG/G
CREAM TOPICAL AT BEDTIME
Qty: 45 G | Refills: 2 | Status: SHIPPED | OUTPATIENT
Start: 2025-06-12

## 2025-06-14 PROBLEM — F10.10 EXCESSIVE DRINKING OF ALCOHOL: Status: RESOLVED | Noted: 2025-06-14 | Resolved: 2025-06-14

## 2025-06-14 PROBLEM — F10.10 EXCESSIVE DRINKING OF ALCOHOL: Status: ACTIVE | Noted: 2025-06-14

## 2025-06-14 RX ORDER — VALACYCLOVIR HYDROCHLORIDE 1 G/1
TABLET, FILM COATED ORAL
Qty: 20 TABLET | Refills: 4 | Status: SHIPPED | OUTPATIENT
Start: 2025-06-14

## 2025-07-14 ENCOUNTER — ALLIED HEALTH/NURSE VISIT (OUTPATIENT)
Dept: FAMILY MEDICINE | Facility: CLINIC | Age: 35
End: 2025-07-14
Payer: COMMERCIAL

## 2025-07-14 DIAGNOSIS — Z29.81 ENCOUNTER FOR HIV PRE-EXPOSURE PROPHYLAXIS: Primary | ICD-10-CM

## 2025-07-14 PROCEDURE — 96372 THER/PROPH/DIAG INJ SC/IM: CPT | Performed by: NURSE PRACTITIONER

## 2025-07-14 PROCEDURE — 99207 PR NO CHARGE NURSE ONLY: CPT

## 2025-07-14 NOTE — PROGRESS NOTES
"Clinic Administered Medication Documentation    New concerns today?: No    Ordering Provider: Yamini Rukcer  Labs (HIV ag/ab plus HIV RNA) completed less than 7 days prior to today's injection? Yes  Last injection date: 2025    Has the prior authorization been completed? Yes    Is there an active order (written within the past 365 days, with administrations remaining, not ) in the chart? Yes    Side effects with previous injections? Yes: Asthma flare with first injection.    Is patient taking any of the following medications?: No  Carbamazepine, oxcarbazepine, phenobarbital, phenytoin, rifabutin, rifampin, rifapentine.      Estimated body mass index is 24.23 kg/m  as calculated from the following:    Height as of 25: 1.746 m (5' 8.75\").    Weight as of 25: 73.9 kg (162 lb 14.4 oz).  Use 2 inch needle if BMI >30kg/m2  Shake the vial vigorously so that the suspension looks uniform before injection. Small air bubbles are expected and acceptable.  Draw up in syringe and administer in the ventrogluteal site.  The dorsogluteal (upper outer quadrant) is an acceptable alternative.  For IM administration only.    Administered Apretude (cabotegravir) to the Dorsogluteal site.  Administered on the left.    Vial/Syringe: Single dose vial. Was entire vial of medication used? Yes    Next labs: 8 weeks +/- 7 days  Next injection: 8 weeks +/- 7 days  Appointments scheduled: Yes    Chrissy PICKETT RN           "

## 2025-07-29 ENCOUNTER — OFFICE VISIT (OUTPATIENT)
Dept: PSYCHOLOGY | Facility: CLINIC | Age: 35
End: 2025-07-29
Payer: COMMERCIAL

## 2025-07-29 DIAGNOSIS — F41.1 GAD (GENERALIZED ANXIETY DISORDER): Primary | ICD-10-CM

## 2025-07-29 PROCEDURE — 90837 PSYTX W PT 60 MINUTES: CPT | Mod: HN

## 2025-07-29 NOTE — PROGRESS NOTES
[facility] - Progress Note    Date of Service: 25   Name: Shar Diaz  : 1990  Medical Record Number: [id number]  Treating Provider: Yolie Panchal, PhD  Type of Session: Individual  Present in Session: Client   Session Start and Stop Time: 4:00-4:55pm  Number of Minutes:  55 min    SERVICE MODALITY:  In-person    DSM-5 Diagnoses:  Anxiety    Current Reported Symptoms and Status update:  Shar and Ed experience discrepancies in their levels of sexual desire. Shar is the higher desire partner and struggles with anxiety and worry related with not feeling desired, affecting is sexual and body esteem. Ed is the lower desiring partner and fears that Shar might leave him because he does not want to engage in sex as frequently. Ed expressed to require novelty to experience interest and to have kinky desires. The couple has loving and affectionate relationship, but are currently dealing with intimacy difficulties as Ed avoids sex. Both express difficulties and embarrassment related with communicating about sex. The couple has an open relationship, but with the exception of a neighbor couple (Kyere & dmitry) that they have sex with separately, Shar was the only one engaging in sex outside of the relationship, until . Currently Shar has a boyfriend, Damion, and Ed as boyfriend, Myron, in the context of their polyamorous marriage.    Areas of treatment focus:  1) Rebuild non-sexual intimacy, including touch.  2) Identify and express sexual desires, including kinks and fantasies.  3) Improve sexual communication.  4) Promote sexual novelty and sexual play.    Changes since last session: Client cancelled his following appointments with his individual therapist due to be needing a more directive and queer/poly informed approached. Is currently dealing with some relationship stress with boyfriend and friend, Kyree. Damion is distressed due to difficulties with his  flatmate and family and requesting Shar's support. Due to this Shar has cancelled plans with Kyree on two occasions.    Progress Toward Treatment Goals:   Satisfactory progress.    Therapeutic Interventions/Treatment Strategies:    Area(s) of treatment focus addressed in this session included Interpersonal Relationship Skills and Sexual Health and Wellness.  Further explored tension between Shar's boyfriend, friend, and Ed, and the implications of this conflict for all parties. Shar reflected on his need to people please and concluded that posed difficulties in his awareness of his own needs. Relates this being a middle child, growing up [place] in [place], and being estranged from his mother and other family dynamics. Discussed in depth is current avoidance in the context of his meaningful relationships and invited a curious attitude to figure out what is avoiding / is afraid of.      Patient Response:   Patient responded to session by accepting feedback, giving feedback, accepting support, and actively engaged. Couple was emotional, tearful, and displaying signs of affection and care to one another.    Possible barriers to participation / learning include: N/A    Current Mental Status Exam:   Appearance:  Appropriate   Eye Contact:  Good   Attitude / Demeanor: Cooperative   Speech      Rate / Production: Normal/ Responsive      Volume:  Normal  volume  Orientation:  All  Mood:   Normal, sad  Affect:   Appropriate   Thought Content: Clear   Insight:   Good       Plan/Need for Future Services:  Return for therapy in 2 weeks to treat diagnosed problems.    Patient has a current master individualized treatment plan.  See Epic treatment plan for more information.    Referral / Collaboration:  Referral to another professional/service is not indicated at this time.  Emergency Services Needed?  No    Assignment:  Ongoing: Invest quality time during meal time and prep.  Ongoing: Going to get massages at the mall and talk  while walk around after going out (reconnecting ritual)   Continued: sensate focus  Past: kink orientation scale    Interactive Complexity:  There are four specific communication difficulties that complicate the work of the primary psychiatric procedure.  Interactive complexity (+[id number]) may be reported when at least one of these difficulties is present.    Communication difficulties present during current the psychiatric procedure include:  None.        Yolie Vaughn, PhD

## 2025-08-11 ENCOUNTER — E-VISIT (OUTPATIENT)
Dept: INTERNAL MEDICINE | Facility: CLINIC | Age: 35
End: 2025-08-11
Payer: COMMERCIAL

## 2025-08-11 DIAGNOSIS — K40.90 RIGHT INGUINAL HERNIA: ICD-10-CM

## 2025-08-11 DIAGNOSIS — K42.9 UMBILICAL HERNIA WITHOUT OBSTRUCTION AND WITHOUT GANGRENE: Primary | ICD-10-CM

## 2025-08-11 PROCEDURE — 99207 PR NON-BILLABLE SERV PER CHARTING: CPT | Performed by: NURSE PRACTITIONER

## 2025-08-22 ENCOUNTER — E-VISIT (OUTPATIENT)
Dept: INTERNAL MEDICINE | Facility: CLINIC | Age: 35
End: 2025-08-22
Payer: COMMERCIAL

## 2025-08-22 DIAGNOSIS — R53.83 LOW ENERGY: ICD-10-CM

## 2025-08-22 DIAGNOSIS — R68.82 LOW LIBIDO: Primary | ICD-10-CM

## 2025-08-22 DIAGNOSIS — N52.9 ERECTILE DYSFUNCTION, UNSPECIFIED ERECTILE DYSFUNCTION TYPE: ICD-10-CM

## 2025-09-03 ENCOUNTER — OFFICE VISIT (OUTPATIENT)
Dept: SURGERY | Facility: CLINIC | Age: 35
End: 2025-09-03
Payer: COMMERCIAL

## 2025-09-03 VITALS — BODY MASS INDEX: 24.54 KG/M2 | WEIGHT: 165 LBS

## 2025-09-03 DIAGNOSIS — K40.90 RIGHT INGUINAL HERNIA: ICD-10-CM

## 2025-09-03 DIAGNOSIS — R10.31 RIGHT GROIN PAIN: Primary | ICD-10-CM

## 2025-09-03 DIAGNOSIS — K42.9 UMBILICAL HERNIA WITHOUT OBSTRUCTION AND WITHOUT GANGRENE: ICD-10-CM

## 2025-09-03 PROCEDURE — 99203 OFFICE O/P NEW LOW 30 MIN: CPT | Performed by: SURGERY
